# Patient Record
Sex: MALE | Race: WHITE | NOT HISPANIC OR LATINO | Employment: PART TIME | URBAN - METROPOLITAN AREA
[De-identification: names, ages, dates, MRNs, and addresses within clinical notes are randomized per-mention and may not be internally consistent; named-entity substitution may affect disease eponyms.]

---

## 2018-02-19 ENCOUNTER — HOSPITAL ENCOUNTER (INPATIENT)
Facility: HOSPITAL | Age: 78
LOS: 4 days | Discharge: HOME | DRG: 378 | End: 2018-02-23
Attending: EMERGENCY MEDICINE | Admitting: HOSPITALIST
Payer: MEDICARE

## 2018-02-19 DIAGNOSIS — Z72.0 TOBACCO ABUSE: ICD-10-CM

## 2018-02-19 DIAGNOSIS — K92.2 UPPER GI BLEED: ICD-10-CM

## 2018-02-19 DIAGNOSIS — K92.0 GASTROINTESTINAL HEMORRHAGE WITH HEMATEMESIS: ICD-10-CM

## 2018-02-19 DIAGNOSIS — D62 ACUTE BLOOD LOSS ANEMIA: ICD-10-CM

## 2018-02-19 DIAGNOSIS — D64.9 ANEMIA: Primary | ICD-10-CM

## 2018-02-19 DIAGNOSIS — I25.10 CAD (CORONARY ARTERY DISEASE): ICD-10-CM

## 2018-02-19 LAB
ABO GROUP BLD: NORMAL
ALBUMIN SERPL BCP-MCNC: 3 G/DL (ref 3.5–5)
ALP SERPL-CCNC: 45 U/L (ref 46–116)
ALT SERPL W P-5'-P-CCNC: 19 U/L (ref 12–78)
ANION GAP SERPL CALCULATED.3IONS-SCNC: 9 MMOL/L (ref 4–13)
APTT PPP: 32 SECONDS (ref 23–35)
AST SERPL W P-5'-P-CCNC: 21 U/L (ref 5–45)
BASOPHILS # BLD AUTO: 0.01 THOUSANDS/ΜL (ref 0–0.1)
BASOPHILS NFR BLD AUTO: 0 % (ref 0–1)
BILIRUB DIRECT SERPL-MCNC: 0.19 MG/DL (ref 0–0.2)
BILIRUB SERPL-MCNC: 0.53 MG/DL (ref 0.2–1)
BLD GP AB SCN SERPL QL: NEGATIVE
BUN SERPL-MCNC: 37 MG/DL (ref 5–25)
CALCIUM SERPL-MCNC: 8.7 MG/DL (ref 8.3–10.1)
CHLORIDE SERPL-SCNC: 105 MMOL/L (ref 100–108)
CO2 SERPL-SCNC: 26 MMOL/L (ref 21–32)
CREAT SERPL-MCNC: 0.86 MG/DL (ref 0.6–1.3)
EOSINOPHIL # BLD AUTO: 0.09 THOUSAND/ΜL (ref 0–0.61)
EOSINOPHIL NFR BLD AUTO: 1 % (ref 0–6)
ERYTHROCYTE [DISTWIDTH] IN BLOOD BY AUTOMATED COUNT: 14 % (ref 11.6–15.1)
GFR SERPL CREATININE-BSD FRML MDRD: 84 ML/MIN/1.73SQ M
GLUCOSE SERPL-MCNC: 110 MG/DL (ref 65–140)
HCT VFR BLD AUTO: 21.3 % (ref 36.5–49.3)
HGB BLD-MCNC: 7.3 G/DL (ref 12–17)
INR PPP: 1.12 (ref 0.86–1.16)
LYMPHOCYTES # BLD AUTO: 2.79 THOUSANDS/ΜL (ref 0.6–4.47)
LYMPHOCYTES NFR BLD AUTO: 27 % (ref 14–44)
MAGNESIUM SERPL-MCNC: 2.2 MG/DL (ref 1.6–2.6)
MCH RBC QN AUTO: 32.3 PG (ref 26.8–34.3)
MCHC RBC AUTO-ENTMCNC: 34.3 G/DL (ref 31.4–37.4)
MCV RBC AUTO: 94 FL (ref 82–98)
MONOCYTES # BLD AUTO: 0.88 THOUSAND/ΜL (ref 0.17–1.22)
MONOCYTES NFR BLD AUTO: 8 % (ref 4–12)
NEUTROPHILS # BLD AUTO: 6.66 THOUSANDS/ΜL (ref 1.85–7.62)
NEUTS SEG NFR BLD AUTO: 64 % (ref 43–75)
NRBC BLD AUTO-RTO: 0 /100 WBCS
PLATELET # BLD AUTO: 191 THOUSANDS/UL (ref 149–390)
PMV BLD AUTO: 11.8 FL (ref 8.9–12.7)
POTASSIUM SERPL-SCNC: 4.3 MMOL/L (ref 3.5–5.3)
PROT SERPL-MCNC: 6.3 G/DL (ref 6.4–8.2)
PROTHROMBIN TIME: 14.4 SECONDS (ref 12.1–14.4)
RBC # BLD AUTO: 2.26 MILLION/UL (ref 3.88–5.62)
RH BLD: POSITIVE
SODIUM SERPL-SCNC: 140 MMOL/L (ref 136–145)
SPECIMEN EXPIRATION DATE: NORMAL
WBC # BLD AUTO: 10.43 THOUSAND/UL (ref 4.31–10.16)

## 2018-02-19 PROCEDURE — 83735 ASSAY OF MAGNESIUM: CPT | Performed by: EMERGENCY MEDICINE

## 2018-02-19 PROCEDURE — 82272 OCCULT BLD FECES 1-3 TESTS: CPT

## 2018-02-19 PROCEDURE — C9113 INJ PANTOPRAZOLE SODIUM, VIA: HCPCS | Performed by: EMERGENCY MEDICINE

## 2018-02-19 PROCEDURE — 30233N1 TRANSFUSION OF NONAUTOLOGOUS RED BLOOD CELLS INTO PERIPHERAL VEIN, PERCUTANEOUS APPROACH: ICD-10-PCS | Performed by: HOSPITALIST

## 2018-02-19 PROCEDURE — 80076 HEPATIC FUNCTION PANEL: CPT | Performed by: EMERGENCY MEDICINE

## 2018-02-19 PROCEDURE — 99223 1ST HOSP IP/OBS HIGH 75: CPT | Performed by: INTERNAL MEDICINE

## 2018-02-19 PROCEDURE — 96361 HYDRATE IV INFUSION ADD-ON: CPT

## 2018-02-19 PROCEDURE — 36430 TRANSFUSION BLD/BLD COMPNT: CPT

## 2018-02-19 PROCEDURE — 86901 BLOOD TYPING SEROLOGIC RH(D): CPT | Performed by: EMERGENCY MEDICINE

## 2018-02-19 PROCEDURE — 36415 COLL VENOUS BLD VENIPUNCTURE: CPT | Performed by: EMERGENCY MEDICINE

## 2018-02-19 PROCEDURE — P9021 RED BLOOD CELLS UNIT: HCPCS

## 2018-02-19 PROCEDURE — 86900 BLOOD TYPING SEROLOGIC ABO: CPT | Performed by: EMERGENCY MEDICINE

## 2018-02-19 PROCEDURE — 80048 BASIC METABOLIC PNL TOTAL CA: CPT | Performed by: EMERGENCY MEDICINE

## 2018-02-19 PROCEDURE — 85730 THROMBOPLASTIN TIME PARTIAL: CPT | Performed by: EMERGENCY MEDICINE

## 2018-02-19 PROCEDURE — 85025 COMPLETE CBC W/AUTO DIFF WBC: CPT | Performed by: EMERGENCY MEDICINE

## 2018-02-19 PROCEDURE — 86920 COMPATIBILITY TEST SPIN: CPT

## 2018-02-19 PROCEDURE — 86850 RBC ANTIBODY SCREEN: CPT | Performed by: EMERGENCY MEDICINE

## 2018-02-19 PROCEDURE — 85610 PROTHROMBIN TIME: CPT | Performed by: EMERGENCY MEDICINE

## 2018-02-19 PROCEDURE — 96374 THER/PROPH/DIAG INJ IV PUSH: CPT

## 2018-02-19 RX ORDER — NICOTINE 21 MG/24HR
1 PATCH, TRANSDERMAL 24 HOURS TRANSDERMAL DAILY
Status: DISCONTINUED | OUTPATIENT
Start: 2018-02-20 | End: 2018-02-23 | Stop reason: HOSPADM

## 2018-02-19 RX ORDER — ONDANSETRON 2 MG/ML
4 INJECTION INTRAMUSCULAR; INTRAVENOUS EVERY 4 HOURS PRN
Status: DISCONTINUED | OUTPATIENT
Start: 2018-02-19 | End: 2018-02-23 | Stop reason: HOSPADM

## 2018-02-19 RX ORDER — PANTOPRAZOLE SODIUM 40 MG/1
40 INJECTION, POWDER, FOR SOLUTION INTRAVENOUS ONCE
Status: COMPLETED | OUTPATIENT
Start: 2018-02-19 | End: 2018-02-19

## 2018-02-19 RX ORDER — CARVEDILOL 25 MG/1
25 TABLET ORAL 2 TIMES DAILY
COMMUNITY
Start: 2017-03-11

## 2018-02-19 RX ORDER — ROSUVASTATIN CALCIUM 20 MG/1
20 TABLET, COATED ORAL DAILY
Status: ON HOLD | COMMUNITY
Start: 2017-03-24 | End: 2021-10-14 | Stop reason: ALTCHOICE

## 2018-02-19 RX ORDER — VALSARTAN 320 MG/1
1 TABLET ORAL
COMMUNITY
Start: 2017-04-15 | End: 2019-09-25 | Stop reason: SDUPTHER

## 2018-02-19 RX ORDER — TAMSULOSIN HYDROCHLORIDE 0.4 MG/1
1 CAPSULE ORAL SEE ADMIN INSTRUCTIONS
COMMUNITY
Start: 2017-04-27 | End: 2019-09-25 | Stop reason: SDUPTHER

## 2018-02-19 RX ORDER — SODIUM CHLORIDE 9 MG/ML
150 INJECTION, SOLUTION INTRAVENOUS CONTINUOUS
Status: DISCONTINUED | OUTPATIENT
Start: 2018-02-19 | End: 2018-02-21

## 2018-02-19 RX ORDER — POLYETHYLENE GLYCOL 3350 17 G/17G
17 POWDER, FOR SOLUTION ORAL DAILY
Status: DISCONTINUED | OUTPATIENT
Start: 2018-02-20 | End: 2018-02-23 | Stop reason: HOSPADM

## 2018-02-19 RX ORDER — BRIMONIDINE TARTRATE 0.15 %
1 DROPS OPHTHALMIC (EYE) 2 TIMES DAILY
COMMUNITY
Start: 2017-09-13 | End: 2018-11-15 | Stop reason: ALTCHOICE

## 2018-02-19 RX ORDER — ACETAMINOPHEN 325 MG/1
650 TABLET ORAL EVERY 6 HOURS PRN
Status: DISCONTINUED | OUTPATIENT
Start: 2018-02-19 | End: 2018-02-23 | Stop reason: HOSPADM

## 2018-02-19 RX ADMIN — PANTOPRAZOLE SODIUM 40 MG: 40 INJECTION, POWDER, FOR SOLUTION INTRAVENOUS at 17:09

## 2018-02-19 RX ADMIN — SODIUM CHLORIDE 150 ML/HR: 0.9 INJECTION, SOLUTION INTRAVENOUS at 20:25

## 2018-02-19 RX ADMIN — SODIUM CHLORIDE 1000 ML: 0.9 INJECTION, SOLUTION INTRAVENOUS at 16:26

## 2018-02-19 NOTE — ED PROVIDER NOTES
History  Chief Complaint   Patient presents with    Abnormal Lab     pt had a fall a week ago and was dx with left humerus fx  pt scheduled for sx on wednesday, had preop blood testing done today  received phone call pt's hemoglobin 4  pt admits to dark stools past couple days  c/o feeling tired  no other symptoms at this time   67 YO male presents for evaluation of anemia  Pt states he recently fractured his Left humerus during a mechanical fall  Pt was being evaluated for surgery and had routine testing which showed a Hgb of 4  Pt states he has had black stool for the last few days  Denies abdominal pain, acid reflux  Pt states he has had bleeding ulcers in the past  States he has had some fatigue, denies shortness of breath and chest pain  Pt denies CP/SOB/F/C/N/V/D/C, no dysuria, burning on urination or blood in urine  History provided by:  Patient and spouse   used: No    Medical Problem   Severity:  Moderate  Onset quality:  Unable to specify  Timing:  Constant  Progression:  Unable to specify  Chronicity:  New  Context:  Anemia found on routine testing  Associated symptoms: fatigue    Associated symptoms: no abdominal pain, no chest pain, no congestion, no cough, no diarrhea, no fever, no myalgias, no nausea, no rash, no shortness of breath and no vomiting        None       Past Medical History:   Diagnosis Date    GI bleed     Heart murmur     Hyperlipidemia        Past Surgical History:   Procedure Laterality Date    CAROTID ARTERY ANGIOPLASTY         History reviewed  No pertinent family history  I have reviewed and agree with the history as documented  Social History   Substance Use Topics    Smoking status: Never Smoker    Smokeless tobacco: Current User    Alcohol use No      Comment: 1 drink of vodka daily , last drink 5 days ago         Review of Systems   Constitutional: Positive for fatigue  Negative for fever     HENT: Negative for congestion and dental problem  Eyes: Negative for visual disturbance  Respiratory: Negative for cough and shortness of breath  Cardiovascular: Negative for chest pain  Gastrointestinal: Negative for abdominal pain, diarrhea, nausea and vomiting  Genitourinary: Negative for dysuria and frequency  Musculoskeletal: Negative for myalgias, neck pain and neck stiffness  Skin: Negative for rash  Neurological: Negative for dizziness, weakness and light-headedness  Psychiatric/Behavioral: Negative for agitation, behavioral problems and confusion  All other systems reviewed and are negative  Physical Exam  ED Triage Vitals [02/19/18 1543]   Temperature Pulse Respirations Blood Pressure SpO2   97 7 °F (36 5 °C) 102 18 127/57 97 %      Temp Source Heart Rate Source Patient Position - Orthostatic VS BP Location FiO2 (%)   Oral Monitor Sitting Right arm --      Pain Score       No Pain           Orthostatic Vital Signs  Vitals:    02/19/18 1543 02/19/18 1715 02/19/18 1838   BP: 127/57 106/54 107/55   Pulse: 102 93 92   Patient Position - Orthostatic VS: Sitting Sitting        Physical Exam   Constitutional: He is oriented to person, place, and time  He appears well-developed and well-nourished  HENT:   Head: Normocephalic and atraumatic  Eyes: EOM are normal    Neck: Normal range of motion  Cardiovascular: Normal rate, regular rhythm and normal heart sounds  Pulmonary/Chest: Effort normal and breath sounds normal    Abdominal: Soft  Genitourinary: Rectal exam shows guaiac positive stool  Musculoskeletal: Normal range of motion  Neurological: He is alert and oriented to person, place, and time  Skin: Skin is warm and dry  Psychiatric: He has a normal mood and affect  His behavior is normal    Nursing note and vitals reviewed        ED Medications  Medications   sodium chloride 0 9 % bolus 1,000 mL (0 mL Intravenous Stopped 2/19/18 1801)   pantoprazole (PROTONIX) injection 40 mg (40 mg Intravenous Given 2/19/18 1709)       Diagnostic Studies  Results Reviewed     Procedure Component Value Units Date/Time    Basic metabolic panel [[id number]]  (Abnormal) Collected:  02/19/18 1624    Lab Status:  Final result Specimen:  Blood from Arm, Right Updated:  02/19/18 1711     Sodium 140 mmol/L      Potassium 4 3 mmol/L      Chloride 105 mmol/L      CO2 26 mmol/L      Anion Gap 9 mmol/L      BUN 37 (H) mg/dL      Creatinine 0 86 mg/dL      Glucose 110 mg/dL      Calcium 8 7 mg/dL      eGFR 84 ml/min/1 73sq m     Narrative:         National Kidney Disease Education Program recommendations are as follows:  GFR calculation is accurate only with a steady state creatinine  Chronic Kidney disease less than 60 ml/min/1 73 sq  meters  Kidney failure less than 15 ml/min/1 73 sq  meters  Hepatic function panel [[id number]]  (Abnormal) Collected:  02/19/18 1624    Lab Status:  Final result Specimen:  Blood from Arm, Right Updated:  02/19/18 1711     Total Bilirubin 0 53 mg/dL      Bilirubin, Direct 0 19 mg/dL      Alkaline Phosphatase 45 (L) U/L      AST 21 U/L      ALT 19 U/L      Total Protein 6 3 (L) g/dL      Albumin 3 0 (L) g/dL     Magnesium [[id number]]  (Normal) Collected:  02/19/18 1624    Lab Status:  Final result Specimen:  Blood from Arm, Right Updated:  02/19/18 1711     Magnesium 2 2 mg/dL     Protime-INR [[id number]]  (Normal) Collected:  02/19/18 1624    Lab Status:  Final result Specimen:  Blood from Arm, Right Updated:  02/19/18 1654     Protime 14 4 seconds      INR 1 12    APTT [[id number]]  (Normal) Collected:  02/19/18 1624    Lab Status:  Final result Specimen:  Blood from Arm, Right Updated:  02/19/18 1654     PTT 32 seconds     Narrative:          Therapeutic Heparin Range = 60-90 seconds    CBC and differential [[id number]]  (Abnormal) Collected:  02/19/18 1624    Lab Status:  Final result Specimen:  Blood from Arm, Right Updated:  02/19/18 1631     WBC 10 43 (H) Thousand/uL      RBC 2 26 (L) Million/uL Hemoglobin 7 3 (L) g/dL      Hematocrit 21 3 (L) %      MCV 94 fL      MCH 32 3 pg      MCHC 34 3 g/dL      RDW 14 0 %      MPV 11 8 fL      Platelets 707 Thousands/uL      nRBC 0 /100 WBCs      Neutrophils Relative 64 %      Lymphocytes Relative 27 %      Monocytes Relative 8 %      Eosinophils Relative 1 %      Basophils Relative 0 %      Neutrophils Absolute 6 66 Thousands/µL      Lymphocytes Absolute 2 79 Thousands/µL      Monocytes Absolute 0 88 Thousand/µL      Eosinophils Absolute 0 09 Thousand/µL      Basophils Absolute 0 01 Thousands/µL                  No orders to display              Procedures  Procedures       Phone Contacts  ED Phone Contact    ED Course  ED Course                                MDM  Number of Diagnoses or Management Options  Anemia: new and requires workup  Upper GI bleed: new and requires workup  Diagnosis management comments: 1  GI bleed - Pt with dark, tarry stool, outpatient testing showing low Hgb  Will recheck Hgb, type/screen, PTT/PT  Will give fluids, start protonix, likely require PRBC's         Amount and/or Complexity of Data Reviewed  Clinical lab tests: ordered and reviewed  Obtain history from someone other than the patient: yes  Discuss the patient with other providers: yes    Patient Progress  Patient progress: stable    CritCare Time    Disposition  Final diagnoses:   Anemia   Upper GI bleed     Time reflects when diagnosis was documented in both MDM as applicable and the Disposition within this note     Time User Action Codes Description Comment    2/19/2018  6:34 PM Torito Carter [D64 9] Anemia     2/19/2018  6:34 PM Ina Thrasher [K92 2] Upper GI bleed       ED Disposition     ED Disposition Condition Comment    Admit  Case was discussed with [facility] PA and the patient's admission status was agreed to be Admission Status: inpatient status to the service of Dr Aaron Laguerre          Follow-up Information    None       Patient's Medications    No medications on file     No discharge procedures on file      ED Provider  Electronically Signed by           Vern Kern MD  02/19/18 2240

## 2018-02-20 ENCOUNTER — ANESTHESIA EVENT (INPATIENT)
Dept: GASTROENTEROLOGY | Facility: HOSPITAL | Age: 78
DRG: 378 | End: 2018-02-20
Payer: MEDICARE

## 2018-02-20 ENCOUNTER — ANESTHESIA (INPATIENT)
Dept: GASTROENTEROLOGY | Facility: HOSPITAL | Age: 78
DRG: 378 | End: 2018-02-20
Payer: MEDICARE

## 2018-02-20 PROBLEM — K92.2 UPPER GI BLEED: Status: ACTIVE | Noted: 2018-02-19

## 2018-02-20 LAB
ABO GROUP BLD BPU: NORMAL
ABO GROUP BLD BPU: NORMAL
ANION GAP SERPL CALCULATED.3IONS-SCNC: 8 MMOL/L (ref 4–13)
BPU ID: NORMAL
BPU ID: NORMAL
BUN SERPL-MCNC: 29 MG/DL (ref 5–25)
CALCIUM SERPL-MCNC: 8.6 MG/DL (ref 8.3–10.1)
CHLORIDE SERPL-SCNC: 107 MMOL/L (ref 100–108)
CO2 SERPL-SCNC: 26 MMOL/L (ref 21–32)
CREAT SERPL-MCNC: 0.76 MG/DL (ref 0.6–1.3)
CROSSMATCH: NORMAL
CROSSMATCH: NORMAL
ERYTHROCYTE [DISTWIDTH] IN BLOOD BY AUTOMATED COUNT: 14.7 % (ref 11.6–15.1)
GFR SERPL CREATININE-BSD FRML MDRD: 88 ML/MIN/1.73SQ M
GLUCOSE SERPL-MCNC: 122 MG/DL (ref 65–140)
HCT VFR BLD AUTO: 24.9 % (ref 36.5–49.3)
HCT VFR BLD AUTO: 25.8 % (ref 36.5–49.3)
HCT VFR BLD AUTO: 25.8 % (ref 36.5–49.3)
HCT VFR BLD AUTO: 25.9 % (ref 36.5–49.3)
HGB BLD-MCNC: 8.6 G/DL (ref 12–17)
HGB BLD-MCNC: 8.7 G/DL (ref 12–17)
HGB BLD-MCNC: 8.9 G/DL (ref 12–17)
MCH RBC QN AUTO: 31.3 PG (ref 26.8–34.3)
MCHC RBC AUTO-ENTMCNC: 33.7 G/DL (ref 31.4–37.4)
MCV RBC AUTO: 93 FL (ref 82–98)
PLATELET # BLD AUTO: 154 THOUSANDS/UL (ref 149–390)
PMV BLD AUTO: 12 FL (ref 8.9–12.7)
POTASSIUM SERPL-SCNC: 3.9 MMOL/L (ref 3.5–5.3)
RBC # BLD AUTO: 2.78 MILLION/UL (ref 3.88–5.62)
SODIUM SERPL-SCNC: 141 MMOL/L (ref 136–145)
UNIT DISPENSE STATUS: NORMAL
UNIT DISPENSE STATUS: NORMAL
UNIT PRODUCT CODE: NORMAL
UNIT PRODUCT CODE: NORMAL
UNIT RH: NORMAL
UNIT RH: NORMAL
WBC # BLD AUTO: 7.52 THOUSAND/UL (ref 4.31–10.16)

## 2018-02-20 PROCEDURE — 36415 COLL VENOUS BLD VENIPUNCTURE: CPT | Performed by: INTERNAL MEDICINE

## 2018-02-20 PROCEDURE — 80048 BASIC METABOLIC PNL TOTAL CA: CPT | Performed by: INTERNAL MEDICINE

## 2018-02-20 PROCEDURE — 43255 EGD CONTROL BLEEDING ANY: CPT | Performed by: INTERNAL MEDICINE

## 2018-02-20 PROCEDURE — 85018 HEMOGLOBIN: CPT | Performed by: INTERNAL MEDICINE

## 2018-02-20 PROCEDURE — 99232 SBSQ HOSP IP/OBS MODERATE 35: CPT | Performed by: HOSPITALIST

## 2018-02-20 PROCEDURE — 99284 EMERGENCY DEPT VISIT MOD MDM: CPT

## 2018-02-20 PROCEDURE — 0W3P8ZZ CONTROL BLEEDING IN GASTROINTESTINAL TRACT, VIA NATURAL OR ARTIFICIAL OPENING ENDOSCOPIC: ICD-10-PCS | Performed by: INTERNAL MEDICINE

## 2018-02-20 PROCEDURE — 85014 HEMATOCRIT: CPT | Performed by: INTERNAL MEDICINE

## 2018-02-20 PROCEDURE — 3E0G8GC INTRODUCTION OF OTHER THERAPEUTIC SUBSTANCE INTO UPPER GI, VIA NATURAL OR ARTIFICIAL OPENING ENDOSCOPIC: ICD-10-PCS | Performed by: INTERNAL MEDICINE

## 2018-02-20 PROCEDURE — 85027 COMPLETE CBC AUTOMATED: CPT | Performed by: INTERNAL MEDICINE

## 2018-02-20 PROCEDURE — 43239 EGD BIOPSY SINGLE/MULTIPLE: CPT | Performed by: INTERNAL MEDICINE

## 2018-02-20 PROCEDURE — C9113 INJ PANTOPRAZOLE SODIUM, VIA: HCPCS | Performed by: INTERNAL MEDICINE

## 2018-02-20 PROCEDURE — 88305 TISSUE EXAM BY PATHOLOGIST: CPT | Performed by: PATHOLOGY

## 2018-02-20 PROCEDURE — 88342 IMHCHEM/IMCYTCHM 1ST ANTB: CPT | Performed by: PATHOLOGY

## 2018-02-20 PROCEDURE — 88342 IMHCHEM/IMCYTCHM 1ST ANTB: CPT | Performed by: INTERNAL MEDICINE

## 2018-02-20 PROCEDURE — 88305 TISSUE EXAM BY PATHOLOGIST: CPT | Performed by: INTERNAL MEDICINE

## 2018-02-20 PROCEDURE — 0DB78ZX EXCISION OF STOMACH, PYLORUS, VIA NATURAL OR ARTIFICIAL OPENING ENDOSCOPIC, DIAGNOSTIC: ICD-10-PCS | Performed by: INTERNAL MEDICINE

## 2018-02-20 PROCEDURE — 99222 1ST HOSP IP/OBS MODERATE 55: CPT | Performed by: INTERNAL MEDICINE

## 2018-02-20 RX ORDER — PROPOFOL 10 MG/ML
INJECTION, EMULSION INTRAVENOUS AS NEEDED
Status: DISCONTINUED | OUTPATIENT
Start: 2018-02-20 | End: 2018-02-20 | Stop reason: SURG

## 2018-02-20 RX ADMIN — SODIUM CHLORIDE 8 MG/HR: 9 INJECTION, SOLUTION INTRAVENOUS at 18:25

## 2018-02-20 RX ADMIN — SODIUM CHLORIDE: 0.9 INJECTION, SOLUTION INTRAVENOUS at 15:56

## 2018-02-20 RX ADMIN — PROPOFOL 50 MG: 10 INJECTION, EMULSION INTRAVENOUS at 15:45

## 2018-02-20 RX ADMIN — PROPOFOL 50 MG: 10 INJECTION, EMULSION INTRAVENOUS at 15:40

## 2018-02-20 RX ADMIN — PROPOFOL 50 MG: 10 INJECTION, EMULSION INTRAVENOUS at 15:49

## 2018-02-20 RX ADMIN — PROPOFOL 130 MG: 10 INJECTION, EMULSION INTRAVENOUS at 15:27

## 2018-02-20 RX ADMIN — LIDOCAINE HYDROCHLORIDE 100 MG: 20 INJECTION, SOLUTION INTRAVENOUS at 15:27

## 2018-02-20 RX ADMIN — PROPOFOL 50 MG: 10 INJECTION, EMULSION INTRAVENOUS at 15:34

## 2018-02-20 RX ADMIN — PROPOFOL 50 MG: 10 INJECTION, EMULSION INTRAVENOUS at 15:31

## 2018-02-20 RX ADMIN — PROPOFOL 50 MG: 10 INJECTION, EMULSION INTRAVENOUS at 15:37

## 2018-02-20 NOTE — ED NOTES
First unit of blood stopped by previous nursing staff  Pt had received full amount of blood       Jacqulin Runner, RN  02/19/18 3007

## 2018-02-20 NOTE — PLAN OF CARE
HEMATOLOGIC - ADULT     Maintains hematologic stability Progressing        Knowledge Deficit     Patient/family/caregiver demonstrates understanding of disease process, treatment plan, medications, and discharge instructions Progressing        MUSCULOSKELETAL - ADULT     Maintain proper alignment of affected body part Progressing        PAIN - ADULT     Verbalizes/displays adequate comfort level or baseline comfort level Progressing        Potential for Falls     Patient will remain free of falls Progressing

## 2018-02-20 NOTE — H&P
History and Physical - [facility] Internal Medicine    Patient Information: Shamika Boo 68 y o  male MRN: [id number]  Unit/Bed#: ED 23 Encounter: [id number]  Admitting Physician: Natalie Riley MD  PCP: Alley Mackay MD  Date of Admission:  02/19/18        Chief Complaint:  Abnormal lab tests  History of Present Illness:    Shamika Boo is a 68 y o  male with past medical history of peptic ulcer disease, carotid artery disease on aspirin, patient had a mechanical fall a week ago where he ended up with fracture of his left humerus, he was supposed to go for surgery this Wednesday and he was completing his outpatient pre surgery testing  Patient had a hemoglobin of 4 and he was instructed to rushed to the ER for further management  In the ER, his repeat hemoglobin was 7 3  The patient reports black stools  He does have history of upper GI bleed where he was found to have peptic ulcer disease by EGD  The patient will be admitted for blood transfusion, Protonix drip, and EGD in a m       Afebrile  Denies any chest pain, SOB, coughing or palpitations  No abd pain, nausea, vomiting, or diarrhea  No dysuria or polyuria  No headaches or dizziness  Review of Systems:  All 10 point review of systems was discussed and otherwise negative    Past Medical and Surgical History:     Past Medical History:   Diagnosis Date    GI bleed     Heart murmur     Hyperlipidemia        Past Surgical History:   Procedure Laterality Date    CAROTID ARTERY ANGIOPLASTY         Meds/Allergies:    Prior to Admission medications    Not on File     I have reviewed home medications with patient personally      Allergies: No Known Allergies    Social History:     Marital Status: /Civil Union     History   Alcohol Use No     Comment: 1 drink of vodka daily , last drink 5 days ago      History   Smoking Status    Never Smoker   Smokeless Tobacco    Current User     History   Drug Use No       Family History:  Asked and noncontributory    Physical Exam:     Vitals:   Blood Pressure: 107/55 (02/19/18 1838)  Pulse: 92 (02/19/18 1838)  Temperature: 97 7 °F (36 5 °C) (02/19/18 1543)  Temp Source: Oral (02/19/18 1543)  Respirations: 16 (02/19/18 1838)  SpO2: 100 % (02/19/18 1838)    General: Alert , Ox3  Head: Normocephalic atraumatic  Eyes: ZHANG  Anicteric sclera  H&N: Supple neck  No palpable lymphadenopathy  Chest CTA BL  No wheezing or crackles  Heart: S1, S2 RRR, loud syst murmur  Abd: soft, Non tender, non distended  Extremities: No oedema  No clubbing or cyanosis  Skin: Intact, no rash  Neuro: Moving all 4 extremities  Additional Data:     Lab Results: I have personally reviewed pertinent reports  Results from last 7 days  Lab Units 02/19/18  1624   WBC Thousand/uL 10 43*   HEMOGLOBIN g/dL 7 3*   HEMATOCRIT % 21 3*   PLATELETS Thousands/uL 191   NEUTROS PCT % 64   LYMPHS PCT % 27   MONOS PCT % 8   EOS PCT % 1       Results from last 7 days  Lab Units 02/19/18  1624   SODIUM mmol/L 140   POTASSIUM mmol/L 4 3   CHLORIDE mmol/L 105   CO2 mmol/L 26   BUN mg/dL 37*   CREATININE mg/dL 0 86   CALCIUM mg/dL 8 7   TOTAL PROTEIN g/dL 6 3*   BILIRUBIN TOTAL mg/dL 0 53   ALK PHOS U/L 45*   ALT U/L 19   AST U/L 21   GLUCOSE RANDOM mg/dL 110       Results from last 7 days  Lab Units 02/19/18  1624   INR  1 12       Imaging: I have personally reviewed pertinent reports  Assessment/Plan:    Hospital Problem List:     Principal Problem:    GI bleed  Active Problems:    Acute blood loss anemia      Plan for the Primary Problem(s):  GI bleed  Acute blood loss anemia  Will admit patient to telemetry  Will hold baby aspirin  H&H q 4 hours  Start Protonix drip  Monitor vital signs closely  Will transfuse 2 units of packed RBCs now  Patient will be NPO for preparation for EGD in a m     Will consult GI    Further recommendations will depend on clinical progression of the case    Carotid artery disease status post endarterectomy  Will hold aspirin and statins  VTE Prophylaxis: No heparin products due to GI bleed  / sequential compression device   Code Status:  Full code  POLST: POLST form is not discussed and not completed at this time  Anticipated Length of Stay:  Patient will be admitted on an Inpatient basis with an anticipated length of stay of  > 2 midnights  Justification for Hospital Stay:  Management of GI bleed    Total Time for Visit, including Counseling / Coordination of Care: 30 minutes  Greater than 50% of this total time spent on direct patient counseling and coordination of care  ** Please Note: Dragon 360 Dictation voice to text software may have been used in the creation of this document   **

## 2018-02-20 NOTE — ED NOTES
Per Brandon wait until second unit of RBCs is completely transfused and then obtain repeat H+H        Liz Pedroza, RN  02/19/18 7516

## 2018-02-20 NOTE — ED NOTES
No blood transfusing when resumed patient care at 0645  Noted that Elisha Batista  Stated in report that 2 units of blood was transfused last night         Benji Gasca RN  02/20/18 6507

## 2018-02-20 NOTE — OP NOTE
OPERATIVE REPORT  PATIENT NAME: Zena Reyes    :  1940  MRN: [id number]  Pt Location: [facility] ROOM 01    SURGERY DATE: 2018    Surgeon(s) and Role:     * Raulito Colmenares MD - Primary    ESOPHAGOGASTRODUODENOSCOPY    PROCEDURE: EGD    SEDATION: Monitored anesthesia care, check anesthesia records    ASA Class: 3    INDICATIONS:  Melena    CONSENT:  Informed consent was obtained for the procedure, including sedation after explaining the risks and benefits of the procedure  Risks including but not limited to bleeding, perforation, infection, and missed lesion  PREPARATION:   Telemetry, pulse oximetry, blood pressure were monitored throughout the procedure  Patient was identified by myself both verbally and by visual inspection of ID band  DESCRIPTION:   Patient was placed in the left lateral decubitus position and was sedated with the above medication  The gastroscope was introduced in to the oropharynx and the esophagus was intubated under direct visualization  Scope was passed down the esophagus up to 2nd part of the duodenum  A careful inspection was made as the gastroscope was withdrawn, including a retroflexed view of the stomach; findings and interventions are described below  FINDINGS:    #1  Esophagus-normal esophagus    #2  Stomach-mild erosive gastritis in the antrum  Two cold biopsies were taken to rule out H pylori infection  Bright red blood noted in the antrum, bulb and 2nd portion of the duodenum  #3  Duodenum-A  10 mm ulcer with actively bleeding visible vessel was noted in the duodenal sweep  The vessel was cauterized using gold probe  Then I injected 1 in 10,000 epinephrine in 1 mL aliquots  A total of 8 mL injected  Then I placed 2 hemoclips  Hemostasis achieved  IMPRESSIONS:      Ulcer with active bleeding in the duodenal sweep  Hemostasis achieved after cauterization, epinephrine injection and hemoclip placement  Gastritis    Biopsy taken to rule out H pylori infection  RECOMMENDATIONS:     Change Protonix to a drip  Monitor hemoglobin closely every 4 hours  NPO  Watch closely for rebleeding  Hold aspirin  COMPLICATIONS:  None; patient tolerated the procedure well            DISPOSITION: PACU           CONDITION: Stable      SIGNATURE: Hugh Bell MD  DATE: February 20, 2018  TIME: 4:06 PM

## 2018-02-20 NOTE — CASE MANAGEMENT
Initial Clinical Review    Admission: Date/Time/Statement: 2/19/18 @ 1835     Orders Placed This Encounter   Procedures    Inpatient Admission (expected length of stay for this patient is greater than two midnights)     Standing Status:   Standing     Number of Occurrences:   1     Order Specific Question:   Admitting Physician     Answer:   Susie Wilks [[id number]]     Order Specific Question:   Level of Care     Answer:   Med Surg [16]     Order Specific Question:   Estimated length of stay     Answer:   More than 2 Midnights     Order Specific Question:   Certification     Answer:   I certify that inpatient services are medically necessary for this patient for a duration of greater than two midnights  See H&P and MD Progress Notes for additional information about the patient's course of treatment  ED: Date/Time/Mode of Arrival:   ED Arrival Information     Expected Arrival Acuity Means of Arrival Escorted By Service Admission Type    - 2/19/2018 15:34 Emergent Walk-In Self General Medicine Emergency    Arrival Complaint    Abnormal Blood Test Result        Chief Complaint:   Chief Complaint   Patient presents with    Abnormal Lab     pt had a fall a week ago and was dx with left humerus fx  pt scheduled for sx on wednesday, had preop blood testing done today  received phone call pt's hemoglobin 4  pt admits to dark stools past couple days  c/o feeling tired  no other symptoms at this time   History of Illness: 67 YO male presents for evaluation of anemia  Pt states he recently fractured his Left humerus during a mechanical fall  Pt was being evaluated for surgery and had routine testing which showed a Hgb of 4  Pt states he has had black stool for the last few days  Denies abdominal pain, acid reflux  Pt states he has had bleeding ulcers in the past  States he has had some fatigue, denies shortness of breath and chest pain   Pt denies CP/SOB/F/C/N/V/D/C, no dysuria, burning on urination or blood in urine     ED Vital Signs:   ED Triage Vitals [02/19/18 1543]   Temperature Pulse Respirations Blood Pressure SpO2   97 7 °F (36 5 °C) 102 18 127/57 97 %      Temp Source Heart Rate Source Patient Position - Orthostatic VS BP Location FiO2 (%)   Oral Monitor Sitting Right arm --      Pain Score       No Pain        Wt Readings from Last 1 Encounters:   No data found for Wt       Abnormal Labs/Diagnostic Test Results:  BUN 37,   T Pro 6 3,   Alb 3 0,   WBC's 10 43,   H&H 7 3 / 21 3    ED Treatment:   Medication Administration from 02/19/2018 1534 to 02/20/2018 1893       Date/Time Order Dose Route Action Action by Comments     02/19/2018 1801 sodium chloride 0 9 % bolus 1,000 mL 0 mL Intravenous Stopped Shanelle Farias RN      02/19/2018 1626 sodium chloride 0 9 % bolus 1,000 mL 1,000 mL Intravenous [address] Shanelle Farias RN      02/19/2018 1709 pantoprazole (PROTONIX) injection 40 mg 40 mg Intravenous Given Shanelle Farias RN      02/20/2018 0826 polyethylene glycol (MIRALAX) packet 17 g 17 g Oral Not Given Mary Phan RN pt having regular formed stools     02/20/2018 0825 nicotine (NICODERM CQ) 14 mg/24hr TD 24 hr patch 1 patch 1 patch Transdermal Not Given Mary Phan, SANTANA pt not a smoker     02/19/2018 2025 sodium chloride 0 9 % infusion 150 mL/hr Intravenous New Bag Bayron Trujillo RN           Past Medical/Surgical History:    Active Ambulatory Problems     Diagnosis Date Noted    No Active Ambulatory Problems     Resolved Ambulatory Problems     Diagnosis Date Noted    No Resolved Ambulatory Problems     Past Medical History:   Diagnosis Date    GI bleed     Heart murmur     Hyperlipidemia        Admitting Diagnosis: Anemia [D64 9]  Upper GI bleed [K92 2]  Abnormal laboratory test result [R89 9]    Age/Sex: 68 y o  male  with past medical history of peptic ulcer disease, carotid artery disease on aspirin, patient had a mechanical fall a week ago where he ended up with fracture of his left humerus, he was supposed to go for surgery this Wednesday and he was completing his outpatient pre surgery testing  Patient had a hemoglobin of 4 and he was instructed to rushed to the ER for further management  In the ER, his repeat hemoglobin was 7 3  The patient reports black stools  He does have history of upper GI bleed where he was found to have peptic ulcer disease by EGD  The patient will be admitted for blood transfusion, Protonix drip, and EGD in a m        Afebrile  Denies any chest pain, SOB, coughing or palpitations  No abd pain, nausea, vomiting, or diarrhea  No dysuria or polyuria  No headaches or dizziness  Assessment/Plan:     GI bleed  Active Problems:    Acute blood loss anemia    Plan for the Primary Problem(s):  GI bleed  Acute blood loss anemia  Will admit patient to telemetry  Will hold baby aspirin  H&H q 4 hours  Start Protonix drip  Monitor vital signs closely  Will transfuse 2 units of packed RBCs now  Patient will be NPO for preparation for EGD in a      Will consult GI  Further recommendations will depend on clinical progression of the case     Carotid artery disease status post endarterectomy  Will hold aspirin and statins      VTE Prophylaxis: No heparin products due to GI bleed  / sequential compression device   Code Status:  Full code  POLST: POLST form is not discussed and not completed at this time      Anticipated Length of Stay:  Patient will be admitted on an Inpatient basis with an anticipated length of stay of  > 2 midnights     Justification for Hospital Stay:  Management of GI bleed    Admission Orders:  IP  TELE  NPO  Transfuse 2 u PC's  Consult GI  SCD's  H&H q4h    Scheduled Meds:   Current Facility-Administered Medications:  acetaminophen 650 mg Oral Q6H PRN Marissa Taylor MD    nicotine 1 patch Transdermal Daily Marissa Taylor MD    ondansetron 4 mg Intravenous Q4H PRN Marissa Taylor MD    polyethylene glycol 17 g Oral Daily Marissa Taylor MD    sodium chloride 150 mL/hr Intravenous Continuous Marissa Taylor MD Last Rate: 150 mL/hr (02/19/18 2025)     Continuous Infusions:   sodium chloride 150 mL/hr Last Rate: 150 mL/hr (02/19/18 2025)     PRN Meds:   acetaminophen    ondansetron      Thank you,  [address] [facility] in the [facility] by [employer]ate[employer] Ruvalc[employer] for 2017  Network Utilization Review Department  Phone: [phone]; Fax [phone]  ATTENTION: The Network Utilization Review Department is now centralized for our 7 Facilities  Make a note that we have a new phone and fax numbers for our Department  Please call with any questions or concerns to [phone] and carefully follow the prompts so that you are directed to the right person  All voicemails are confidential  Fax any determinations, approvals, denials, and requests for initial or continue stay review clinical to [phone]  Due to HIGH CALL volume, it would be easier if you could please send faxed requests to expedite your requests and in part, help us provide discharge notifications faster

## 2018-02-20 NOTE — CONSULTS
Consultation - [facility]  Shira Fitzgerald 68 y o  male MRN: [id number]  Unit/Bed#: [facility] 87 223-01 Encounter: [id number]        Inpatient consult to gastroenterology  Consult performed by: Lupe Mccabe ordered by: Kamaljit Izaguirre          Reason for Consult / Principal Problem:  Upper GI bleed, acute blood loss anemia    HPI: 60-year-old male with history of carotid artery disease on aspirin, aortic stenosis, hyperlipidemia, and GI bleeding presenting for evaluation of melena  Patient reports acute onset of melena 2 days ago  He admits to some associated fatigue but denies shortness of breath and chest pain  He denies nausea, vomiting, heartburn, decreased appetite, abdominal pain, hematochezia, weight loss  He denies NSAID use  He takes a baby aspirin daily but denies taking other antiplatelets or anticoagulants  Of note last week he sustained a mechanical fall and fracture of his left humerus  He was supposed to undergo surgery tomorrow  As part of outpatient pre surgery testing, he had blood work done yesterday which showed hemoglobin of 4 and so he was instructed to come to the ED immediately for further management  In the ED, his repeat hemoglobin was 7 3 down from baseline 14 in June 2017  Patient has prior history of GI bleeding dating back to 2013  He was treated at a hospital in [place]  He states he underwent several upper endoscopies because the doctors could not identify the source of bleeding  He even required a capsule endoscopy  He is unsure whether he was diagnosed with an ulcer but the site of bleeding did require cauterization  REVIEW OF SYSTEMS:    CONSTITUTIONAL: Denies any fever, chills  Good appetite, and no recent weight loss  HEENT: No earache or tinnitus  Denies hearing loss or visual disturbances  CARDIOVASCULAR: No chest pain or palpitations     RESPIRATORY: Denies any cough, hemoptysis, shortness of breath or dyspnea on exertion  GASTROINTESTINAL: As noted in the History of Present Illness  GENITOURINARY: No problems with urination  Denies any hematuria or dysuria  NEUROLOGIC: No dizziness or vertigo, denies headaches  MUSCULOSKELETAL: Denies any muscle or joint pain  SKIN: Denies skin rashes or itching  ENDOCRINE: Denies excessive thirst  Denies intolerance to heat or cold  PSYCHOSOCIAL: Denies depression or anxiety  Denies any recent memory loss  Historical Information   Past Medical History:   Diagnosis Date    GI bleed     Heart murmur     Hyperlipidemia      Past Surgical History:   Procedure Laterality Date    CAROTID ARTERY ANGIOPLASTY       Social History   History   Alcohol Use No     Comment: 1 drink of vodka daily , last drink 5 days ago      History   Drug Use No     History   Smoking Status    Never Smoker   Smokeless Tobacco    Current User     History reviewed  No pertinent family history  Meds/Allergies     Prescriptions Prior to Admission   Medication    brimonidine (ALPHAGAN P) 0 15 % ophthalmic solution    carvedilol (COREG) 25 mg tablet    rosuvastatin (CRESTOR) 20 MG tablet    tamsulosin (FLOMAX) 0 4 mg    ASPIRIN 81 PO    valsartan (DIOVAN) 320 MG tablet     Current Facility-Administered Medications   Medication Dose Route Frequency    acetaminophen (TYLENOL) tablet 650 mg  650 mg Oral Q6H PRN    nicotine (NICODERM CQ) 14 mg/24hr TD 24 hr patch 1 patch  1 patch Transdermal Daily    ondansetron (ZOFRAN) injection 4 mg  4 mg Intravenous Q4H PRN    polyethylene glycol (MIRALAX) packet 17 g  17 g Oral Daily    sodium chloride 0 9 % infusion  150 mL/hr Intravenous Continuous       No Known Allergies        Objective     Blood pressure 135/69, pulse 87, temperature 99 8 °F (37 7 °C), temperature source Temporal, resp  rate 18, SpO2 95 %        Intake/Output Summary (Last 24 hours) at 02/20/18 1019  Last data filed at 02/20/18 0725   Gross per 24 hour   Intake             1700 ml Output              200 ml   Net             1500 ml         PHYSICAL EXAM:      General Appearance:   Alert, pleasant obese male, cooperative, no acute distress, appears stated age    HEENT:   Normocephalic, atraumatic, anicteric      Neck:  Supple, symmetrical, trachea midline, no adenopathy    Lungs:   Clear to auscultation bilaterally; no rales, rhonchi or wheezing; respirations unlabored    Heart[de-identified]   S1 and S2 normal; murmur of AS   Abdomen:   Soft, non-tender, non-distended; normal bowel sounds; no masses, no organomegaly    Genitalia:   Deferred    Rectal:   Deferred    Extremities:  No cyanosis, clubbing or edema    Pulses:  2+ and symmetric all extremities    Skin:  Skin color, texture, turgor normal, no rashes or lesions    Lymph nodes:  No palpable cervical lymphadenopathy        Lab Results:     Results from last 7 days  Lab Units 02/20/18  0531  02/19/18  1624   WBC Thousand/uL 7 52  --  10 43*   HEMOGLOBIN g/dL 8 7*  8 7*  < > 7 3*   HEMATOCRIT % 25 8*  25 8*  < > 21 3*   PLATELETS Thousands/uL 154  --  191   NEUTROS PCT %  --   --  64   LYMPHS PCT %  --   --  27   MONOS PCT %  --   --  8   EOS PCT %  --   --  1   < > = values in this interval not displayed  Results from last 7 days  Lab Units 02/20/18  0531 02/19/18  1624   SODIUM mmol/L 141 140   POTASSIUM mmol/L 3 9 4 3   CHLORIDE mmol/L 107 105   CO2 mmol/L 26 26   BUN mg/dL 29* 37*   CREATININE mg/dL 0 76 0 86   CALCIUM mg/dL 8 6 8 7   TOTAL PROTEIN g/dL  --  6 3*   BILIRUBIN TOTAL mg/dL  --  0 53   ALK PHOS U/L  --  45*   ALT U/L  --  19   AST U/L  --  21   GLUCOSE RANDOM mg/dL 122 110       Results from last 7 days  Lab Units 02/19/18  1624   INR  1 12           Imaging Studies: I have personally reviewed pertinent imaging studies  No results found  ASSESSMENT and PLAN:      69-year-old male with history of carotid artery disease on aspirin, aortic stenosis, hyperlipidemia, and GI bleeding presenting for evaluation of melena      1) Upper GI bleeding with acute blood loss anemia: He admits to black stools for the past 2 days, similar to his prior GI bleed back in 2013 which required endoscopic therapy with cauterization  Upon presentation, hemoglobin 7 3 down from baseline of 14 in June 2017  He received 2 units of PRBCs and hemoglobin responded appropriately to 8 9  BUN elevated at 37  He appears in no acute distress and is hemodynamically stable  Differential diagnosis includes sources of upper GI bleeding including erosive esophagitis, gastritis, peptic ulcer disease, Dieulafoy's lesion, AVM, mass     -Keep NPO and plan for EGD later today  -Continue Protonix twice daily  -Monitor H&H and transfuse as necessary  -Avoid NSAIDs  -If source of bleeding not identified on EGD, may need push enteroscopy or capsule endoscopy to evaluate for small bowel AVMs    Patient was seen and examined by Dr Nubia Renee  All salcido medical decisions were made by Dr Nubia Renee  Thank you for allowing us to participate in the care of this present patient  We will follow-up with you closely

## 2018-02-20 NOTE — ED NOTES
Patient verbally understood importance of remaining NPO at this time  Patient calm/cooperative  Sling applied to L arm for patient comfort       Obed Chaves RN  02/20/18 9665

## 2018-02-20 NOTE — PROGRESS NOTES
Progress Note - Tina Bolton 68 y o  male MRN: [id number]    [facility] [address] 87 223-01 Encounter: [id number]      Assessment/Plan:  1-melena secondary to bleeding duodenal ulcer  Status post cauterization, epinephrine injection and hemoclip placement  Patient remains NPO but will allow ice chips  On a Protonix drip  Will continue to monitor H&H q 4 hours  Biopsies taken for H pylori which will be available in a week's time  Patient remains hemodynamically stable  2-acute blood loss anemia secondary to 1-hemoglobin currently stable at 8 6  Will continue to monitor and transfuse p r n  for hemoglobin less than 7     3-carotid artery disease status post endarterectomy  -aspirin on hold    4-fracture left humerus-patient was scheduled for surgery tomorrow but secondary to the bleed this will be postponed  Left arm in sling  5-ongoing tobacco use  Counseled with regards to cessation  Patient pre contemplative  On a nicotine patch      Subjective:  Patient admitted with melena  Underwent EGD today which revealed a 10 mm ulcer with active bleeding in the duodenum  Hemostasis achieved after cauterization, epinephrine injection and hemoclip placement  Biopsies taken to rule out H pylori infection  Patient on a Protonix drip  Aspirin on hold  Physical Exam:   Vitals: Blood pressure 146/82, pulse 82, temperature 98 4 °F (36 9 °C), temperature source Temporal, resp  rate 18, SpO2 99 %  ,There is no height or weight on file to calculate BMI  Gen:  Pleasant, non-tachypnic, non-dyspnic  Conversant  Heart: regular rate and rhythm, S1S2 present, no murmur, rub or gallop  Lungs: clear to ausculatation bilaterally  No wheezing, crackless, or rhonchi  No accessory muscle use or respiratory distress  Abd: soft, non-tender, non-distended  NABS, no guarding, rebound or peritoneal signs  Extremities:  Left arm in sling  Neuro: awake, alert and oriented  Cranial nerves 2-12 intact    Strength and sensation grossly intact  Skin: warm and dry: no petechiae, purpura and rash      LABS:     Results from last 7 days  Lab Units 02/20/18  1255 02/20/18  0531 02/20/18  0335 02/19/18  1624   WBC Thousand/uL  --  7 52  --  10 43*   HEMOGLOBIN g/dL 8 6* 8 7*  8 7* 8 9* 7 3*   HEMATOCRIT % 24 9* 25 8*  25 8* 25 9* 21 3*   PLATELETS Thousands/uL  --  154  --  191       Results from last 7 days  Lab Units 02/20/18  0531 02/19/18  1624   SODIUM mmol/L 141 140   POTASSIUM mmol/L 3 9 4 3   CHLORIDE mmol/L 107 105   CO2 mmol/L 26 26   BUN mg/dL 29* 37*   CREATININE mg/dL 0 76 0 86   GLUCOSE RANDOM mg/dL 122 110   CALCIUM mg/dL 8 6 8 7       Intake/Output Summary (Last 24 hours) at 02/20/18 1751  Last data filed at 02/20/18 1556   Gross per 24 hour   Intake             2000 ml   Output              200 ml   Net             1800 ml           Current Facility-Administered Medications:  acetaminophen 650 mg Oral Q6H PRN Marissa Taylor MD    nicotine 1 patch Transdermal Daily Marissa Taylor MD    ondansetron 4 mg Intravenous Q4H PRN Marissa Taylor MD    pantoprozole (PROTONIX) infusion (Continuous) 8 mg/hr Intravenous Continuous Dossie MD James    polyethylene glycol 17 g Oral Daily Marissa Taylor MD    sodium chloride 150 mL/hr Intravenous Continuous Marissa Taylor MD Last Rate: 150 mL/hr (02/19/18 2025)       Family update- wife in the room-updated

## 2018-02-20 NOTE — ANESTHESIA POSTPROCEDURE EVALUATION
Post-Op Assessment Note      CV Status:  Stable    Mental Status:  Alert and awake    Hydration Status:  Euvolemic    PONV Controlled:  Controlled    Airway Patency:  Patent    Post Op Vitals Reviewed: Yes          Staff: Anesthesiologist           /58 (02/20/18 1606)    Temp      Pulse 78 (02/20/18 1606)   Resp (!) 28 (02/20/18 1606)    SpO2 97 % (02/20/18 1606)

## 2018-02-20 NOTE — ANESTHESIA PREPROCEDURE EVALUATION
Review of Systems/Medical History          Cardiovascular  Hyperlipidemia, Hypertension controlled,    Pulmonary  Smoker ex-smoker  ,        GI/Hepatic  Negative GI/hepatic ROS          Negative  ROS        Endo/Other  Negative endo/other ROS      GYN       Hematology  Anemia ,     Musculoskeletal  Negative musculoskeletal ROS        Neurology  Negative neurology ROS      Psychology   Negative psychology ROS              Physical Exam    Airway    Mallampati score: II  TM Distance: >3 FB  Neck ROM: full     Dental   upper dentures,     Cardiovascular  Rhythm: regular, Rate: normal, Cardiovascular exam normal    Pulmonary  Pulmonary exam normal Breath sounds clear to auscultation,     Other Findings        Anesthesia Plan  ASA Score- 3     Anesthesia Type- general and IV sedation with anesthesia with ASA Monitors  Additional Monitors:   Airway Plan:         Plan Factors-    Induction- intravenous  Postoperative Plan-     Informed Consent- Anesthetic plan and risks discussed with patient and spouse

## 2018-02-21 LAB
ANION GAP SERPL CALCULATED.3IONS-SCNC: 7 MMOL/L (ref 4–13)
BUN SERPL-MCNC: 15 MG/DL (ref 5–25)
CALCIUM SERPL-MCNC: 8.3 MG/DL (ref 8.3–10.1)
CHLORIDE SERPL-SCNC: 109 MMOL/L (ref 100–108)
CO2 SERPL-SCNC: 26 MMOL/L (ref 21–32)
CREAT SERPL-MCNC: 0.64 MG/DL (ref 0.6–1.3)
ERYTHROCYTE [DISTWIDTH] IN BLOOD BY AUTOMATED COUNT: 15.5 % (ref 11.6–15.1)
GFR SERPL CREATININE-BSD FRML MDRD: 94 ML/MIN/1.73SQ M
GLUCOSE SERPL-MCNC: 92 MG/DL (ref 65–140)
HCT VFR BLD AUTO: 24.9 % (ref 36.5–49.3)
HGB BLD-MCNC: 8.3 G/DL (ref 12–17)
HGB BLD-MCNC: 8.4 G/DL (ref 12–17)
HGB BLD-MCNC: 9 G/DL (ref 12–17)
MCH RBC QN AUTO: 31.3 PG (ref 26.8–34.3)
MCHC RBC AUTO-ENTMCNC: 33.3 G/DL (ref 31.4–37.4)
MCV RBC AUTO: 94 FL (ref 82–98)
PLATELET # BLD AUTO: 158 THOUSANDS/UL (ref 149–390)
PMV BLD AUTO: 11.5 FL (ref 8.9–12.7)
POTASSIUM SERPL-SCNC: 3.8 MMOL/L (ref 3.5–5.3)
RBC # BLD AUTO: 2.65 MILLION/UL (ref 3.88–5.62)
SODIUM SERPL-SCNC: 142 MMOL/L (ref 136–145)
WBC # BLD AUTO: 6.64 THOUSAND/UL (ref 4.31–10.16)

## 2018-02-21 PROCEDURE — 99232 SBSQ HOSP IP/OBS MODERATE 35: CPT | Performed by: HOSPITALIST

## 2018-02-21 PROCEDURE — 99232 SBSQ HOSP IP/OBS MODERATE 35: CPT | Performed by: INTERNAL MEDICINE

## 2018-02-21 PROCEDURE — C9113 INJ PANTOPRAZOLE SODIUM, VIA: HCPCS | Performed by: INTERNAL MEDICINE

## 2018-02-21 PROCEDURE — 85018 HEMOGLOBIN: CPT | Performed by: PHYSICIAN ASSISTANT

## 2018-02-21 PROCEDURE — 85018 HEMOGLOBIN: CPT | Performed by: INTERNAL MEDICINE

## 2018-02-21 PROCEDURE — 80048 BASIC METABOLIC PNL TOTAL CA: CPT | Performed by: INTERNAL MEDICINE

## 2018-02-21 PROCEDURE — 85027 COMPLETE CBC AUTOMATED: CPT | Performed by: INTERNAL MEDICINE

## 2018-02-21 RX ADMIN — SODIUM CHLORIDE 150 ML/HR: 0.9 INJECTION, SOLUTION INTRAVENOUS at 07:30

## 2018-02-21 RX ADMIN — SODIUM CHLORIDE 150 ML/HR: 0.9 INJECTION, SOLUTION INTRAVENOUS at 00:37

## 2018-02-21 RX ADMIN — SODIUM CHLORIDE 8 MG/HR: 9 INJECTION, SOLUTION INTRAVENOUS at 16:12

## 2018-02-21 RX ADMIN — SODIUM CHLORIDE 8 MG/HR: 9 INJECTION, SOLUTION INTRAVENOUS at 02:16

## 2018-02-21 RX ADMIN — SODIUM CHLORIDE 150 ML/HR: 0.9 INJECTION, SOLUTION INTRAVENOUS at 15:36

## 2018-02-21 NOTE — PLAN OF CARE
DISCHARGE PLANNING     Discharge to home or other facility with appropriate resources Progressing        HEMATOLOGIC - ADULT     Maintains hematologic stability Progressing        Knowledge Deficit     Patient/family/caregiver demonstrates understanding of disease process, treatment plan, medications, and discharge instructions Progressing        MUSCULOSKELETAL - ADULT     Maintain proper alignment of affected body part Progressing        PAIN - ADULT     Verbalizes/displays adequate comfort level or baseline comfort level Progressing        Potential for Falls     Patient will remain free of falls Progressing

## 2018-02-21 NOTE — PROGRESS NOTES
Progress Note - Jamir Bass 68 y o  male MRN: [id number]    Unit/Bed#: [facility] [address] 87 223-01 Encounter: [id number]      Assessment/Plan:    1-melena secondary to bleeding duodenal ulcer  Status post cauterization, epinephrine injection and hemoclip placement  Patient on a full liquid diet  On a Protonix drip  Will continue to monitor H&H q 6 hours  Biopsies taken for H pylori which will be available in a week's time  Patient remains hemodynamically stable  If remains stable plan to switch to IV Protonix and subsequently to oral Protonix and hopefully DC Friday      2-acute blood loss anemia secondary to 1-hemoglobin currently stable at 8 6  Will continue to monitor and transfuse p r n  for hemoglobin less than 7      3-carotid artery disease status post endarterectomy  -aspirin on hold     4-fracture left humerus-patient was scheduled for surgery tomorrow but secondary to the bleed this will be postponed  Left arm in sling      5-ongoing tobacco use  Counseled with regards to cessation  Patient pre contemplative  On a nicotine patch         Subjective:  Patient feels well  No further episodes of bleeding  Will advance diet to pureed  Continue to monitor hemoglobin  Will change to IV Protonix from tomorrow  Physical Exam:   Vitals: Blood pressure 139/66, pulse 76, temperature 98 4 °F (36 9 °C), temperature source Tympanic, resp  rate 18, SpO2 97 %  ,There is no height or weight on file to calculate BMI  Gen:  Pleasant, non-tachypnic, non-dyspnic  Conversant  Heart: regular rate and rhythm, S1S2 present, no murmur, rub or gallop  Lungs: clear to ausculatation bilaterally  No wheezing, crackless, or rhonchi  No accessory muscle use or respiratory distress  Abd: soft, non-tender, non-distended  NABS, no guarding, rebound or peritoneal signs  Extremities: no clubbing, cyanosis or edema  2+pedal pulses bilaterally  Full range of motion  Neuro: awake, alert and oriented   Cranial nerves 2-12 intact  Strength and sensation grossly intact  Skin: warm and dry: no petechiae, purpura and rash  LABS:     Results from last 7 days  Lab Units 02/21/18  0610 02/21/18  0037 02/20/18  1933 02/20/18  1255 02/20/18  0531  02/19/18  1624   WBC Thousand/uL 6 64  --   --   --  7 52  --  10 43*   HEMOGLOBIN g/dL 8 3* 8 4* 8 7* 8 6* 8 7*  8 7*  < > 7 3*   HEMATOCRIT % 24 9*  --   --  24 9* 25 8*  25 8*  < > 21 3*   PLATELETS Thousands/uL 158  --   --   --  154  --  191   < > = values in this interval not displayed      Results from last 7 days  Lab Units 02/21/18  0610 02/20/18  0531 02/19/18  1624   SODIUM mmol/L 142 141 140   POTASSIUM mmol/L 3 8 3 9 4 3   CHLORIDE mmol/L 109* 107 105   CO2 mmol/L 26 26 26   BUN mg/dL 15 29* 37*   CREATININE mg/dL 0 64 0 76 0 86   GLUCOSE RANDOM mg/dL 92 122 110   CALCIUM mg/dL 8 3 8 6 8 7       Intake/Output Summary (Last 24 hours) at 02/21/18 1603  Last data filed at 02/21/18 0037   Gross per 24 hour   Intake             1000 ml   Output                0 ml   Net             1000 ml           Current Facility-Administered Medications:  acetaminophen 650 mg Oral Q6H PRN Marissa Taylor MD    nicotine 1 patch Transdermal Daily Marissa Taylor MD    ondansetron 4 mg Intravenous Q4H PRN Marissa Taylor MD    pantoprozole (PROTONIX) infusion (Continuous) 8 mg/hr Intravenous Continuous Ayla Cote MD Last Rate: 8 mg/hr (02/21/18 0216)   polyethylene glycol 17 g Oral Daily Marissa Taylor MD    sodium chloride 150 mL/hr Intravenous Continuous Marissa Taylor MD Last Rate: 150 mL/hr (02/21/18 1536)       Family update-wife in the room-updated

## 2018-02-21 NOTE — PROGRESS NOTES
Progress Note - Lethea Mooring 68 y o  male MRN: [id number]    Unit/Bed#: [facility] 2 -01 Encounter: [id number]    Subjective:     Patient seen and examined at bedside  He reports feeling well this morning  He denies abdominal pain  No bowel movements or bleeding overnight  Objective:     Vitals: Blood pressure 164/76, pulse 80, temperature (!) 97 4 °F (36 3 °C), temperature source Tympanic, resp  rate 19, SpO2 97 %  ,There is no height or weight on file to calculate BMI  Intake/Output Summary (Last 24 hours) at 02/21/18 0933  Last data filed at 02/21/18 0037   Gross per 24 hour   Intake             1300 ml   Output                0 ml   Net             1300 ml       Physical Exam:     General Appearance: Alert, appears stated age and cooperative  Lungs: Clear to auscultation bilaterally, no rales or rhonchi, no labored breathing/accessory muscle use  Heart: Regular rate and rhythm, S1, S2 normal, + systolic murmur  Abdomen: Soft, non-tender, non-distended; bowel sounds normal; no masses or no organomegaly  Extremities: No cyanosis, edema    Invasive Devices     Peripheral Intravenous Line            Peripheral IV 02/19/18 Right Antecubital 1 day    Peripheral IV 02/19/18 Right Wrist 1 day                Lab Results:    Results from last 7 days  Lab Units 02/21/18  0610  02/19/18  1624   WBC Thousand/uL 6 64  < > 10 43*   HEMOGLOBIN g/dL 8 3*  < > 7 3*   HEMATOCRIT % 24 9*  < > 21 3*   PLATELETS Thousands/uL 158  < > 191   NEUTROS PCT %  --   --  64   LYMPHS PCT %  --   --  27   MONOS PCT %  --   --  8   EOS PCT %  --   --  1   < > = values in this interval not displayed      Results from last 7 days  Lab Units 02/21/18  0610  02/19/18  1624   SODIUM mmol/L 142  < > 140   POTASSIUM mmol/L 3 8  < > 4 3   CHLORIDE mmol/L 109*  < > 105   CO2 mmol/L 26  < > 26   BUN mg/dL 15  < > 37*   CREATININE mg/dL 0 64  < > 0 86   CALCIUM mg/dL 8 3  < > 8 7   TOTAL PROTEIN g/dL  --   --  6 3*   BILIRUBIN TOTAL mg/dL  -- --  0 53   ALK PHOS U/L  --   --  45*   ALT U/L  --   --  19   AST U/L  --   --  21   GLUCOSE RANDOM mg/dL 92  < > 110   < > = values in this interval not displayed  Results from last 7 days  Lab Units 02/19/18  1624   INR  1 12           Imaging Studies: I have personally reviewed pertinent imaging studies  No results found  Assessment and Plan:    51-year-old male with history of carotid artery disease on aspirin, aortic stenosis, hyperlipidemia, and GI bleeding presenting for evaluation of melena     1) Upper GI bleeding 2/2 duodenal ulcer: status post EGD 2/20/18 which showed a 10 mm ulcer with actively bleeding visible vessel in the duodenal sweep status post cauterization, injection of epinephrine, and clip placement  Hemostasis was achieved  He did not require blood transfusion overnight  Hemoglobin stable at 8 3  No bowel movements or bleeding overnight      -Advance to clear liquids today  -Continue Protonix gtt x 72 hours  -Monitor H&H every 8 hours for now  -Avoid NSAIDs    2) Erosive gastritis: Identified during EGD yesterday   Biopsy taken to rule out H pylori infection     -Await biopsy results  -Continue Protonix

## 2018-02-22 LAB
ANION GAP SERPL CALCULATED.3IONS-SCNC: 9 MMOL/L (ref 4–13)
BUN SERPL-MCNC: 11 MG/DL (ref 5–25)
CALCIUM SERPL-MCNC: 8.6 MG/DL (ref 8.3–10.1)
CHLORIDE SERPL-SCNC: 107 MMOL/L (ref 100–108)
CO2 SERPL-SCNC: 25 MMOL/L (ref 21–32)
CREAT SERPL-MCNC: 0.77 MG/DL (ref 0.6–1.3)
ERYTHROCYTE [DISTWIDTH] IN BLOOD BY AUTOMATED COUNT: 15.7 % (ref 11.6–15.1)
GFR SERPL CREATININE-BSD FRML MDRD: 88 ML/MIN/1.73SQ M
GLUCOSE SERPL-MCNC: 117 MG/DL (ref 65–140)
HCT VFR BLD AUTO: 27 % (ref 36.5–49.3)
HGB BLD-MCNC: 9.1 G/DL (ref 12–17)
MCH RBC QN AUTO: 31.9 PG (ref 26.8–34.3)
MCHC RBC AUTO-ENTMCNC: 33.7 G/DL (ref 31.4–37.4)
MCV RBC AUTO: 95 FL (ref 82–98)
PLATELET # BLD AUTO: 180 THOUSANDS/UL (ref 149–390)
PMV BLD AUTO: 11.5 FL (ref 8.9–12.7)
POTASSIUM SERPL-SCNC: 3.7 MMOL/L (ref 3.5–5.3)
RBC # BLD AUTO: 2.85 MILLION/UL (ref 3.88–5.62)
SODIUM SERPL-SCNC: 141 MMOL/L (ref 136–145)
WBC # BLD AUTO: 6.37 THOUSAND/UL (ref 4.31–10.16)

## 2018-02-22 PROCEDURE — C9113 INJ PANTOPRAZOLE SODIUM, VIA: HCPCS | Performed by: INTERNAL MEDICINE

## 2018-02-22 PROCEDURE — 99232 SBSQ HOSP IP/OBS MODERATE 35: CPT | Performed by: INTERNAL MEDICINE

## 2018-02-22 PROCEDURE — 85027 COMPLETE CBC AUTOMATED: CPT | Performed by: INTERNAL MEDICINE

## 2018-02-22 PROCEDURE — 80048 BASIC METABOLIC PNL TOTAL CA: CPT | Performed by: INTERNAL MEDICINE

## 2018-02-22 PROCEDURE — C9113 INJ PANTOPRAZOLE SODIUM, VIA: HCPCS | Performed by: HOSPITALIST

## 2018-02-22 PROCEDURE — 99232 SBSQ HOSP IP/OBS MODERATE 35: CPT | Performed by: HOSPITALIST

## 2018-02-22 RX ORDER — PANTOPRAZOLE SODIUM 40 MG/1
40 INJECTION, POWDER, FOR SOLUTION INTRAVENOUS EVERY 12 HOURS SCHEDULED
Status: DISCONTINUED | OUTPATIENT
Start: 2018-02-22 | End: 2018-02-23 | Stop reason: HOSPADM

## 2018-02-22 RX ADMIN — PANTOPRAZOLE SODIUM 40 MG: 40 INJECTION, POWDER, FOR SOLUTION INTRAVENOUS at 21:39

## 2018-02-22 RX ADMIN — SODIUM CHLORIDE 8 MG/HR: 9 INJECTION, SOLUTION INTRAVENOUS at 02:49

## 2018-02-22 RX ADMIN — PANTOPRAZOLE SODIUM 40 MG: 40 INJECTION, POWDER, FOR SOLUTION INTRAVENOUS at 13:32

## 2018-02-22 NOTE — PROGRESS NOTES
Progress Note - Rosendo Mitchell 68 y o  male MRN: [id number]    Unit/Bed#: [facility] 68 2 -01 Encounter: [id number]      Assessment/Plan:    1-melena secondary to bleeding duodenal ulcer   Status post cauterization, epinephrine injection and hemoclip placement  Now on a regular diet -non ulcergenic     On a Protonix drip  Hemoglobin remained stable at 9 1    Biopsies taken for H pylori which will be available in a week's time  Deepthi Ga remains hemodynamically stable  If remains stable plan to switch to IV Protonix and subsequently to oral Protonix and hopefully DC Friday      2-acute blood loss anemia secondary to 1-hemoglobin currently stable at 9 1   Will continue to monitor and transfuse p r n  for hemoglobin less than 7      3-carotid artery disease status post endarterectomy  -aspirin on hold     4-fracture left humerus-patient was scheduled for surgery tomorrow but secondary to the bleed this will be postponed   Left arm in sling      5-ongoing tobacco use   Counseled with regards to cessation   Patient pre contemplative   On a nicotine patch    Anticipate discharge home Friday  Subjective:  Patient doing well  Had a dark formed black stool  No bleeding  Hemoglobin has come up to 9 1  Patient to be transitioned to a regular diet today  If he tolerates that he could be discharged home tomorrow  Hold Xarelto for a week    Physical Exam:   Vitals: Blood pressure 121/65, pulse 79, temperature 98 3 °F (36 8 °C), temperature source Tympanic, resp  rate 19, SpO2 98 %  ,There is no height or weight on file to calculate BMI  Gen:  Pleasant, non-tachypnic, non-dyspnic  Conversant  Heart: regular rate and rhythm, S1S2 present, no murmur, rub or gallop  Lungs: clear to ausculatation bilaterally  No wheezing, crackless, or rhonchi  No accessory muscle use or respiratory distress  Abd: soft, non-tender, non-distended  NABS, no guarding, rebound or peritoneal signs    Extremities: no clubbing, cyanosis or edema  2+pedal pulses bilaterally  Full range of motion  Neuro: awake, alert and oriented  Cranial nerves 2-12 intact  Strength and sensation grossly intact  Skin: warm and dry: no petechiae, purpura and rash  LABS:     Results from last 7 days  Lab Units 02/22/18  0534 02/21/18  1614 02/21/18  0610  02/20/18  1255 02/20/18  0531   WBC Thousand/uL 6 37  --  6 64  --   --  7 52   HEMOGLOBIN g/dL 9 1* 9 0* 8 3*  < > 8 6* 8 7*  8 7*   HEMATOCRIT % 27 0*  --  24 9*  --  24 9* 25 8*  25 8*   PLATELETS Thousands/uL 180  --  158  --   --  154   < > = values in this interval not displayed      Results from last 7 days  Lab Units 02/22/18  0534 02/21/18  0610 02/20/18  0531   SODIUM mmol/L 141 142 141   POTASSIUM mmol/L 3 7 3 8 3 9   CHLORIDE mmol/L 107 109* 107   CO2 mmol/L 25 26 26   BUN mg/dL 11 15 29*   CREATININE mg/dL 0 77 0 64 0 76   GLUCOSE RANDOM mg/dL 117 92 122   CALCIUM mg/dL 8 6 8 3 8 6       Intake/Output Summary (Last 24 hours) at 02/22/18 1304  Last data filed at 02/21/18 1736   Gross per 24 hour   Intake           2547 5 ml   Output                0 ml   Net           2547 5 ml           Current Facility-Administered Medications:  acetaminophen 650 mg Oral Q6H PRN Marisas Taylor MD    nicotine 1 patch Transdermal Daily Marissa Taylor MD    ondansetron 4 mg Intravenous Q4H PRN Marissa Taylor MD    pantoprozole (PROTONIX) infusion (Continuous) 8 mg/hr Intravenous Continuous Jacquie Bravo MD Last Rate: 8 mg/hr (02/22/18 0249)   polyethylene glycol 17 g Oral Daily Marissa Taylor MD

## 2018-02-22 NOTE — SOCIAL WORK
CM met with patient at bedside to address discharge needs  Present was patients wife, Lisset Chinchilla; CM requested permission to speak, which was granted  Patient lives in a townhouse with wife; bedroom is located on 2nd floor, patient denies difficulty with steps  Patient is independent with ADLs and functional mobility  Support system is wife, children, and g-children  Food shopping is done by both & meal prep is done by wife  Patient denies use of DME  Patient is able to ambulate without assistance  No hx of VNA reported  Patient is employed  PCP identified as nahomy Ortiz, and primary/designated caregiver is wife  Patient uses [facility] on [facility]  for Rx needs; patient made aware of [facility] to use at discharge if needed  Patient does drive; reports wife available at discharge to transport home  Help/support reported  Patient made aware of CM's name, number and role  CM to follow as case progresses and needs are determined

## 2018-02-22 NOTE — PROGRESS NOTES
Progress Note - Rosendo Mitchell 68 y o  male MRN: [id number]    Unit/Bed#: [place] 68 2 -01 Encounter: [id number]    Subjective:     Patient seen and examined at bedside  He had a dark formed BM last night  No gross blood reported  He denies abdominal pain  He is tolerating diet  Objective:     Vitals: Blood pressure 121/65, pulse 79, temperature 98 3 °F (36 8 °C), temperature source Tympanic, resp  rate 19, SpO2 98 %  ,There is no height or weight on file to calculate BMI  Intake/Output Summary (Last 24 hours) at 02/22/18 1027  Last data filed at 02/21/18 1736   Gross per 24 hour   Intake           2547 5 ml   Output                0 ml   Net           2547 5 ml       Physical Exam:     General Appearance: Alert, appears stated age and cooperative  Lungs: Clear to auscultation bilaterally, no rales or rhonchi, no labored breathing/accessory muscle use  Heart: Regular rate and rhythm, S1, S2 normal, + murmur  Abdomen: Soft, non-tender, non-distended; bowel sounds normal; no masses or no organomegaly  Extremities: No cyanosis, edema    Invasive Devices     Peripheral Intravenous Line            Peripheral IV 02/19/18 Right Antecubital 2 days    Peripheral IV 02/19/18 Right Wrist 2 days                Lab Results:    Results from last 7 days  Lab Units 02/22/18  0534  02/19/18  1624   WBC Thousand/uL 6 37  < > 10 43*   HEMOGLOBIN g/dL 9 1*  < > 7 3*   HEMATOCRIT % 27 0*  < > 21 3*   PLATELETS Thousands/uL 180  < > 191   NEUTROS PCT %  --   --  64   LYMPHS PCT %  --   --  27   MONOS PCT %  --   --  8   EOS PCT %  --   --  1   < > = values in this interval not displayed      Results from last 7 days  Lab Units 02/22/18  0534  02/19/18  1624   SODIUM mmol/L 141  < > 140   POTASSIUM mmol/L 3 7  < > 4 3   CHLORIDE mmol/L 107  < > 105   CO2 mmol/L 25  < > 26   BUN mg/dL 11  < > 37*   CREATININE mg/dL 0 77  < > 0 86   CALCIUM mg/dL 8 6  < > 8 7   TOTAL PROTEIN g/dL  --   --  6 3*   BILIRUBIN TOTAL mg/dL  --   --  0 53 ALK PHOS U/L  --   --  45*   ALT U/L  --   --  19   AST U/L  --   --  21   GLUCOSE RANDOM mg/dL 117  < > 110   < > = values in this interval not displayed  Results from last 7 days  Lab Units 02/19/18  1624   INR  1 12           Imaging Studies: I have personally reviewed pertinent imaging studies  No results found  Assessment and Plan:    1) Upper GI bleeding 2/2 duodenal ulcer: status post EGD 2/20/18 which showed a 10 mm ulcer with actively bleeding visible vessel in the duodenal sweep status post cauterization, injection of epinephrine, and clip placement  Hemoglobin has remained stable at 9 1  No active bleeding reported      -Advance to regular non-ulcerogenic diet   -Monitor H&H   -If hemoglobin remains stable tomorrow, can D/C Protonix drip and switch to twice daily dosing  -Avoid NSAIDs     2) Erosive gastritis: Identified during EGD  Biopsy taken to rule out H pylori infection       -Await biopsy results  -Continue Protonix

## 2018-02-22 NOTE — PLAN OF CARE

## 2018-02-23 VITALS
HEART RATE: 85 BPM | RESPIRATION RATE: 19 BRPM | TEMPERATURE: 98.3 F | OXYGEN SATURATION: 97 % | SYSTOLIC BLOOD PRESSURE: 127 MMHG | DIASTOLIC BLOOD PRESSURE: 60 MMHG

## 2018-02-23 PROBLEM — K92.2 UPPER GI BLEED: Status: RESOLVED | Noted: 2018-02-19 | Resolved: 2018-02-23

## 2018-02-23 PROBLEM — K92.2 GI BLEED: Status: RESOLVED | Noted: 2018-02-19 | Resolved: 2018-02-23

## 2018-02-23 LAB
ERYTHROCYTE [DISTWIDTH] IN BLOOD BY AUTOMATED COUNT: 15.9 % (ref 11.6–15.1)
HCT VFR BLD AUTO: 26.5 % (ref 36.5–49.3)
HGB BLD-MCNC: 8.8 G/DL (ref 12–17)
MCH RBC QN AUTO: 31.8 PG (ref 26.8–34.3)
MCHC RBC AUTO-ENTMCNC: 33.2 G/DL (ref 31.4–37.4)
MCV RBC AUTO: 96 FL (ref 82–98)
PLATELET # BLD AUTO: 181 THOUSANDS/UL (ref 149–390)
PMV BLD AUTO: 11.8 FL (ref 8.9–12.7)
RBC # BLD AUTO: 2.77 MILLION/UL (ref 3.88–5.62)
WBC # BLD AUTO: 5.96 THOUSAND/UL (ref 4.31–10.16)

## 2018-02-23 PROCEDURE — C9113 INJ PANTOPRAZOLE SODIUM, VIA: HCPCS | Performed by: HOSPITALIST

## 2018-02-23 PROCEDURE — 99232 SBSQ HOSP IP/OBS MODERATE 35: CPT | Performed by: HOSPITALIST

## 2018-02-23 PROCEDURE — 85027 COMPLETE CBC AUTOMATED: CPT | Performed by: HOSPITALIST

## 2018-02-23 PROCEDURE — 99232 SBSQ HOSP IP/OBS MODERATE 35: CPT | Performed by: PHYSICIAN ASSISTANT

## 2018-02-23 RX ORDER — PANTOPRAZOLE SODIUM 40 MG/1
40 TABLET, DELAYED RELEASE ORAL 2 TIMES DAILY
Qty: 60 TABLET | Refills: 1 | Status: SHIPPED | OUTPATIENT
Start: 2018-02-23 | End: 2018-04-26 | Stop reason: SDUPTHER

## 2018-02-23 RX ORDER — ASPIRIN 81 MG/1
81 TABLET, CHEWABLE ORAL DAILY
Refills: 0
Start: 2018-02-23

## 2018-02-23 RX ORDER — NICOTINE 21 MG/24HR
1 PATCH, TRANSDERMAL 24 HOURS TRANSDERMAL DAILY
Qty: 28 PATCH | Refills: 0 | Status: SHIPPED | OUTPATIENT
Start: 2018-02-24 | End: 2018-11-15 | Stop reason: ALTCHOICE

## 2018-02-23 RX ADMIN — PANTOPRAZOLE SODIUM 40 MG: 40 INJECTION, POWDER, FOR SOLUTION INTRAVENOUS at 08:32

## 2018-02-23 NOTE — PROGRESS NOTES
Discharge Summary - John Marquis 68 y o  male MRN: [id number]    [facility] 68 2 MED SURG Room / Bed: [place] [address] 2 /[place] 2 M* Encounter: [id number]    BRIEF OVERVIEW      Admission Date: 2/19/2018       Discharge Date:  02/23/2018    Admitting Diagnosis:  Upper GI bleed    Primary Discharge Diagnosis  Principal Problem:  upper GI bleed  Bleeding duodenal ulcer  Active Problems:    Acute blood loss anemia    Coronary artery disease  Tobacco use  Fracture left humerus  Service:  [facility]    Consulting Providers   GI-Dr Sriram Quinones    Procedures Performed   EGD   #1  Esophagus-normal esophagus     #2  Stomach-mild erosive gastritis in the antrum  Two cold biopsies were taken to rule out H pylori infection  Bright red blood noted in the antrum, bulb and 2nd portion of the duodenum  #3  Duodenum-A  10 mm ulcer with actively bleeding visible vessel was noted in the duodenal sweep  The vessel was cauterized using gold probe  Then I injected 1 in 10,000 epinephrine in 1 mL aliquots  A total of 8 mL injected  Then I placed 2 hemoclips  Hemostasis achieved        Assessment and plan on date of discharge  1-melena secondary to bleeding duodenal ulcer   Status post cauterization, epinephrine injection and hemoclip placement   Patient on a full liquid diet    On a Protonix drip   Will continue to monitor H&H q 6 hours   Biopsies taken for H pylori which will be available in a week's time   Patient remains hemodynamically stable  If remains stable plan to switch to IV Protonix and subsequently to oral Protonix and hopefully DC Friday      2-acute blood loss anemia secondary to 1-hemoglobin currently stable at 8 6   Will continue to monitor and transfuse p r n  for hemoglobin less than 7      3-carotid artery disease status post endarterectomy  -aspirin on hold     4-fracture left humerus-patient was scheduled for surgery tomorrow but secondary to the bleed this will be postponed   Left arm in sling      5-ongoing tobacco use   Counseled with regards to cessation   Patient pre contemplative   On a nicotine patch       Discharge Condition: Improved    Discharge Disposition:  Home  Discharge summary:     Lisa Servin is an extremely pleasant 60-year-old male with a prior medical history of aortic stenosis, coronary artery disease on aspirin-who was admitted to [facility] on 02/19/2018 with melena  The patient had a prior history of GI bleed in 2013 and was evaluated extensively including a capsule enteroscopy -he states that he did have an ulcer which required cauterization at that time  One week prior he sustained a mechanical fall and fractured his left humerus  He was due to have surgery and had blood work as part of pre surgery testing which revealed a hemoglobin of 4  However in the ED his hemoglobin was noted to be 7 3 which is down from his baseline of 14 in June of 2017  He underwent an EGD which revealed a bleeding duodenal ulcer which was cauterized with epinephrine and a hemoclip placed  The patient was maintained on a Protonix drip  His hemoglobin is now stable at 8 8  There is no further episodes of bleeding  He has been transitioned to the non ulcer erosion and diet  Biopsies of the ulcer had been taken this will be reviewed with him on follow-up in a week's time  He has been asked to hold his aspirin and restarted in a week's time  Should there be any further recurrences of bleeding he has been asked to come back to the ER immediately  He will be on Protonix 40 mg twice a day for at least 2 months  He remains stable for discharge           Discharge Medications   Please see Medical Reconciliation Discharge Form    Discharge Follow Up Appointments:   PCP  GI doctor Julianna    Discharge  Statement   Total Time Spent today including physical exam, discussion with patient and family, and discharge arrangements/care 41 minutes

## 2018-02-23 NOTE — PROGRESS NOTES
Progress Note - Singh Paul 68 y o  male MRN: [id number]    Unit/Bed#: [place] 2 -01 Encounter: [id number]      Assessment/Plan:  1-melena secondary to bleeding duodenal ulcer   Status post cauterization, epinephrine injection and hemoclip placement  Now on a regular diet -non ulcergenic     On a Protonix drip  This will be switched to oral Protonix 40 mg twice a day  Hemoglobin remained stable at 8 8   Biopsies taken for H pylori which will be available in a week's time  Vladimir Lucas remains hemodynamically stable      2-acute blood loss anemia secondary to 1-hemoglobin currently stable at 8 8   Will continue to monitor and transfuse p r n  for hemoglobin less than 7      3-carotid artery disease status post endarterectomy  -aspirin on hold-restart in 1 weeks time     4-fracture left humerus-patient was scheduled for surgery tomorrow but secondary to the bleed this will be postponed   Left arm in sling      5-ongoing tobacco use   Counseled with regards to cessation   Patient pre contemplative   On a nicotine patch     Anticipate discharge home today  Patient has an appointment with PCP in 5 days time and will repeat a CBC at that time  Subjective:  Patient doing well  No further episodes of melena  Hemoglobin stable  Recommended Protonix 40 mg twice a day for 2 months at least   Restart aspirin in 1 weeks time  To follow up with GI as an outpatient  Physical Exam:   Vitals: Blood pressure 127/60, pulse 85, temperature 98 3 °F (36 8 °C), temperature source Tympanic, resp  rate 19, SpO2 97 %  ,There is no height or weight on file to calculate BMI  Gen:  Pleasant, non-tachypnic, non-dyspnic  Conversant  Heart: regular rate and rhythm, S1S2 present, no murmur, rub or gallop  Lungs: clear to ausculatation bilaterally  No wheezing, crackless, or rhonchi  No accessory muscle use or respiratory distress  Abd: soft, non-tender, non-distended   NABS, no guarding, rebound or peritoneal signs   Extremities: no clubbing, cyanosis or edema  2+pedal pulses bilaterally  Full range of motion  Neuro: awake, alert and oriented  Cranial nerves 2-12 intact  Strength and sensation grossly intact  Skin: warm and dry: no petechiae, purpura and rash      LABS:     Results from last 7 days  Lab Units 02/23/18  0624 02/22/18  0534 02/21/18  1614 02/21/18  0610   WBC Thousand/uL 5 96 6 37  --  6 64   HEMOGLOBIN g/dL 8 8* 9 1* 9 0* 8 3*   HEMATOCRIT % 26 5* 27 0*  --  24 9*   PLATELETS Thousands/uL 181 180  --  158       Results from last 7 days  Lab Units 02/22/18  0534 02/21/18  0610 02/20/18  0531   SODIUM mmol/L 141 142 141   POTASSIUM mmol/L 3 7 3 8 3 9   CHLORIDE mmol/L 107 109* 107   CO2 mmol/L 25 26 26   BUN mg/dL 11 15 29*   CREATININE mg/dL 0 77 0 64 0 76   GLUCOSE RANDOM mg/dL 117 92 122   CALCIUM mg/dL 8 6 8 3 8 6     No intake or output data in the 24 hours ending 02/23/18 8812        Current Facility-Administered Medications:  acetaminophen 650 mg Oral Q6H PRN Marissa Taylor MD   nicotine 1 patch Transdermal Daily Marissa Taylor MD   ondansetron 4 mg Intravenous Q4H PRN Marissa Taylor MD   pantoprazole 40 mg Intravenous Q12H 93 Doug Heredia MD   polyethylene glycol 17 g Oral Daily Marissa Taylor MD

## 2018-02-23 NOTE — PROGRESS NOTES
Progress Note - Zena Starch 68 y o  male MRN: [id number]    Unit/Bed#: [facility] 68 2 [place] 223-01 Encounter: [id number]      ASSESSMENT/ PLAN:   1  Upper Gi bleeding: secondary to duodenal ulcer seen on EGD 2/20/18, with actively bleeding vessel in duodenal sweep requiring cauterization, epi injection and clip placement  Hgb remains stable  He is tolerating regular diet  -continue PPI BID x 8 weeks, then daily indefinitely   -restart ASA after 1 week  -avoid NSAIDs  -discussed reflux diet   -okay for discharge from GI standpoint, discussed with primary     2  Erosive gastritis: EGD with gastritis, bx negative for h pylori or intestinal dysplasia or metaplasia  -continue PPI therapy as above  -decrease caffeine and alcohol intake, avoid NSAID use    Subjective:     Patient seen and examined, no complaints except for poor sleep     Objective:     Vitals: Blood pressure 127/60, pulse 85, temperature 98 3 °F (36 8 °C), temperature source Tympanic, resp  rate 19, SpO2 97 %  ,There is no height or weight on file to calculate BMI      No intake or output data in the 24 hours ending 02/23/18 1109    Physical Exam:     General Appearance: A&Ox3, appears stated age and cooperative  Lungs: Clear to auscultation bilaterally, no rales or rhonchi  Heart: Regular rate and rhythm, S1, S2 normal, no murmur, click, rub or gallop  Abdomen: Soft, non-tender, non-distended; bowel sounds normal; no masses or no organomegaly  Extremities: No cyanosis, clubbing, edema, left arm sling noted    Invasive Devices          No matching active lines, drains, or airways          Lab Results:      Results from last 7 days  Lab Units 02/23/18  0624  02/19/18  1624   WBC Thousand/uL 5 96  < > 10 43*   HEMOGLOBIN g/dL 8 8*  < > 7 3*   HEMATOCRIT % 26 5*  < > 21 3*   PLATELETS Thousands/uL 181  < > 191   NEUTROS PCT %  --   --  64   LYMPHS PCT %  --   --  27   MONOS PCT %  --   --  8   EOS PCT %  --   --  1   < > = values in this interval not displayed  Results from last 7 days  Lab Units 02/22/18  0534  02/19/18  1624   SODIUM mmol/L 141  < > 140   POTASSIUM mmol/L 3 7  < > 4 3   CHLORIDE mmol/L 107  < > 105   CO2 mmol/L 25  < > 26   BUN mg/dL 11  < > 37*   CREATININE mg/dL 0 77  < > 0 86   CALCIUM mg/dL 8 6  < > 8 7   TOTAL PROTEIN g/dL  --   --  6 3*   BILIRUBIN TOTAL mg/dL  --   --  0 53   ALK PHOS U/L  --   --  45*   ALT U/L  --   --  19   AST U/L  --   --  21   GLUCOSE RANDOM mg/dL 117  < > 110   < > = values in this interval not displayed  Results from last 7 days  Lab Units 02/19/18  1624   INR  1 12           Imaging Studies: I have personally reviewed pertinent imaging studies  No results found

## 2018-02-23 NOTE — PLAN OF CARE
DISCHARGE PLANNING     Discharge to home or other facility with appropriate resources Progressing        DISCHARGE PLANNING - CARE MANAGEMENT     Discharge to post-acute care or home with appropriate resources Progressing        HEMATOLOGIC - ADULT     Maintains hematologic stability Progressing        Knowledge Deficit     Patient/family/caregiver demonstrates understanding of disease process, treatment plan, medications, and discharge instructions Progressing        MUSCULOSKELETAL - ADULT     Maintain proper alignment of affected body part Progressing        PAIN - ADULT     Verbalizes/displays adequate comfort level or baseline comfort level Progressing        Potential for Falls     Patient will remain free of falls Progressing

## 2018-02-23 NOTE — DISCHARGE INSTR - AVS FIRST PAGE
Please get a CBC checked when you follow up with your primary care physician on Wednesday  3/1/2018

## 2018-02-23 NOTE — DISCHARGE SUMMARY
Discharge Summary - [address] 68 y o  male MRN: [id number]  [address] SURG Room / Bed: [place] [address] 2 [facility] 2 M* Encounter: [id number]     BRIEF OVERVIEW        Admission Date: 2/19/2018       Discharge Date:  02/23/2018     Admitting Diagnosis:  Upper GI bleed     Primary Discharge Diagnosis  Principal Problem:  upper GI bleed  Bleeding duodenal ulcer  Active Problems:    Acute blood loss anemia    Coronary artery disease  Tobacco use  Fracture left humerus  Service:  [facility]     Consulting Providers   GI-Dr Nubia Renee     Procedures Performed   EGD   #1  Esophagus-normal esophagus     #2  Stomach-mild erosive gastritis in the antrum   Two cold biopsies were taken to rule out H pylori infection  Bright red blood noted in the antrum, bulb and 2nd portion of the duodenum  #3  Duodenum-A  10 mm ulcer with actively bleeding visible vessel was noted in the duodenal sweep   The vessel was cauterized using gold probe   Then I injected 1 in 10,000 epinephrine in 1 mL aliquots   A total of 8 mL injected   Then I placed 2 hemoclips   Hemostasis achieved         Assessment and plan on date of discharge  1-melena secondary to bleeding duodenal ulcer   Status post cauterization, epinephrine injection and hemoclip placement   Patient on a full liquid diet    On a Protonix drip   Will continue to monitor H&H q 6 hours   Biopsies taken for H pylori which will be available in a week's time  Mahawa Jessica remains hemodynamically stable   If remains stable plan to switch to IV Protonix and subsequently to oral Protonix and hopefully DC Friday      2-acute blood loss anemia secondary to 1-hemoglobin currently stable at 8 6   Will continue to monitor and transfuse p r n  for hemoglobin less than 7      3-carotid artery disease status post endarterectomy  -aspirin on hold     4-fracture left humerus-patient was scheduled for surgery tomorrow but secondary to the bleed this will be postponed   Left arm in sling      5-ongoing tobacco use   Counseled with regards to cessation   Patient pre contemplative   On a nicotine patch        Discharge Condition: Improved     Discharge Disposition:  Home  Discharge summary:      Anthony Blanco is an extremely pleasant 49-year-old male with a prior medical history of aortic stenosis, coronary artery disease on aspirin-who was admitted to [facility] on 02/19/2018 with melena  The patient had a prior history of GI bleed in 2013 and was evaluated extensively including a capsule enteroscopy -he states that he did have an ulcer which required cauterization at that time  One week prior he sustained a mechanical fall and fractured his left humerus  He was due to have surgery and had blood work as part of pre surgery testing which revealed a hemoglobin of 4  However in the ED his hemoglobin was noted to be 7 3 which is down from his baseline of 14 in June of 2017  He underwent an EGD which revealed a bleeding duodenal ulcer which was cauterized with epinephrine and a hemoclip placed  The patient was maintained on a Protonix drip  His hemoglobin is now stable at 8 8  There is no further episodes of bleeding  He has been transitioned to the non ulcer erosion and diet  Biopsies of the ulcer had been taken this will be reviewed with him on follow-up in a week's time  He has been asked to hold his aspirin and restarted in a week's time  Should there be any further recurrences of bleeding he has been asked to come back to the ER immediately  He will be on Protonix 40 mg twice a day for at least 2 months    He remains stable for discharge             Discharge Medications   Please see Medical Reconciliation Discharge Form     Discharge Follow Up Appointments:   PCP  GI doctor Julianna     Discharge  Statement   Total Time Spent today including physical exam, discussion with patient and family, and discharge arrangements/care 39 minutes

## 2018-02-23 NOTE — PLAN OF CARE
Problem: Potential for Falls  Goal: Patient will remain free of falls  INTERVENTIONS:  - Assess patient frequently for physical needs  -  Identify cognitive and physical deficits and behaviors that affect risk of falls    -  [place] fall precautions as indicated by assessment   - Educate patient/family on patient safety including physical limitations  - Instruct patient to call for assistance with activity based on assessment  - Modify environment to reduce risk of injury  - Consider OT/PT consult to assist with strengthening/mobility   Outcome: Completed Date Met: 02/23/18      Problem: PAIN - ADULT  Goal: Verbalizes/displays adequate comfort level or baseline comfort level  Interventions:  - Encourage patient to monitor pain and request assistance  - Assess pain using appropriate pain scale  - Administer analgesics based on type and severity of pain and evaluate response  - Implement non-pharmacological measures as appropriate and evaluate response  - Consider cultural and social influences on pain and pain management  - Notify physician/advanced practitioner if interventions unsuccessful or patient reports new pain   Outcome: Completed Date Met: 02/23/18      Problem: DISCHARGE PLANNING  Goal: Discharge to home or other facility with appropriate resources  INTERVENTIONS:  - Identify barriers to discharge w/patient and caregiver  - Arrange for needed discharge resources and transportation as appropriate  - Identify discharge learning needs (meds, wound care, etc )  - Arrange for interpretive services to assist at discharge as needed  - Refer to Case Management Department for coordinating discharge planning if the patient needs post-hospital services based on physician/advanced practitioner order or complex needs related to functional status, cognitive ability, or social support system   Outcome: Completed Date Met: 02/23/18      Problem: Knowledge Deficit  Goal: Patient/family/caregiver demonstrates understanding of disease process, treatment plan, medications, and discharge instructions  Complete learning assessment and assess knowledge base    Interventions:  - Provide teaching at level of understanding  - Provide teaching via preferred learning methods   Outcome: Completed Date Met: 02/23/18      Problem: HEMATOLOGIC - ADULT  Goal: Maintains hematologic stability  INTERVENTIONS  - Assess for signs and symptoms of bleeding or hemorrhage  - Monitor labs  - Administer supportive blood products/factors as ordered and appropriate   Outcome: Completed Date Met: 02/23/18      Problem: MUSCULOSKELETAL - ADULT  Goal: Maintain proper alignment of affected body part  INTERVENTIONS:  - Support, maintain and protect limb and body alignment  - Provide pt/fam with appropriate education   Outcome: Completed Date Met: 02/23/18      Problem: DISCHARGE PLANNING - CARE MANAGEMENT  Goal: Discharge to post-acute care or home with appropriate resources  INTERVENTIONS:  - Conduct assessment to determine patient/family and health care team treatment goals, and need for post-acute services based on payer coverage, community resources, and patient preferences, and barriers to discharge  - Address psychosocial, clinical, and financial barriers to discharge as identified in assessment in conjunction with the patient/family and health care team  - Arrange appropriate level of post-acute services according to patients   needs and preference and payer coverage in collaboration with the physician and health care team  - Communicate with and update the patient/family, physician, and health care team regarding progress on the discharge plan  - Arrange appropriate transportation to post-acute venues   Outcome: Completed Date Met: 02/23/18

## 2018-04-26 ENCOUNTER — OFFICE VISIT (OUTPATIENT)
Dept: GASTROENTEROLOGY | Facility: MEDICAL CENTER | Age: 78
End: 2018-04-26
Payer: MEDICARE

## 2018-04-26 VITALS
DIASTOLIC BLOOD PRESSURE: 76 MMHG | TEMPERATURE: 98.7 F | WEIGHT: 235 LBS | HEART RATE: 76 BPM | SYSTOLIC BLOOD PRESSURE: 118 MMHG

## 2018-04-26 DIAGNOSIS — K92.0 GASTROINTESTINAL HEMORRHAGE WITH HEMATEMESIS: ICD-10-CM

## 2018-04-26 DIAGNOSIS — K26.9 DUODENAL ULCER: Primary | ICD-10-CM

## 2018-04-26 DIAGNOSIS — D62 ACUTE BLOOD LOSS ANEMIA: ICD-10-CM

## 2018-04-26 PROCEDURE — 99214 OFFICE O/P EST MOD 30 MIN: CPT | Performed by: PHYSICIAN ASSISTANT

## 2018-04-26 RX ORDER — PANTOPRAZOLE SODIUM 40 MG/1
40 TABLET, DELAYED RELEASE ORAL DAILY
Qty: 30 TABLET | Refills: 5 | Status: SHIPPED | OUTPATIENT
Start: 2018-04-26 | End: 2018-10-12 | Stop reason: SDUPTHER

## 2018-04-26 NOTE — LETTER
April 26, 2018     Jaime Gustafson MD  [facility] [address]    Patient: Danielle Luna   YOB: 1940   Date of Visit: 4/26/2018       Dear Dr Angela Arndt:    Thank you for referring Danielle Luna to me for evaluation  Below are my notes for this consultation  If you have questions, please do not hesitate to call me  I look forward to following your patient along with you  Sincerely,        Arvin Knowles PA-C        CC: No Recipients  Arvin Knowles Eduard Melena  4/26/2018  8:44 AM  Sign at close encounter  Assessment/Plan:     Diagnoses and all orders for this visit:    Duodenal ulcer/Acute blood loss anemia  Patient was admitted with melena and a decreased hemoglobin was found to have a duodenal ulcer  This was injected and clipped  He has been on pantoprazole b i d  since  Would recommend at this time to decrease to once a day  Since he requires baby aspirin would continue indefinitely at this point  He states his hemoglobin is stable and therefore would not recommend any further treatment at this time  -     pantoprazole (PROTONIX) 40 mg tablet; Take 1 tablet (40 mg total) by mouth daily    Will see him back on an as-needed basis  Subjective:      Patient ID: Danielle Luna is a 68 y o  male  HPI     This is a hospital follow-up for GI bleeding  Patient was seen in February when he presented with melena  He was found to have a decrease in hemoglobin from 14-7  He denied NSAIDs but was taking a baby aspirin daily  He underwent EGD which showed a 10 mm duodenal ulcer that was bleeding  It was injected and clipped  He did well after an on discharge his hemoglobin was 8 8  He states he did have a repeat hemoglobin at his PCPs however he is unsure of what the number was  He is still taking his pantoprazole twice a day  He denies any symptoms of nausea, vomiting, heartburn, abdominal pain or melena  He had a previous episode of GI bleeding back in 2013    He underwent endoscopy, colonoscopy and eventually enteroscopy to identify the source  Patient Active Problem List   Diagnosis    Acute blood loss anemia    Duodenal ulcer     No Known Allergies  Current Outpatient Prescriptions on File Prior to Visit   Medication Sig    aspirin (ASPIRIN 81) 81 mg chewable tablet Chew 1 tablet (81 mg total) daily    brimonidine (ALPHAGAN P) 0 15 % ophthalmic solution Administer 1 drop to the right eye 2 (two) times a day    carvedilol (COREG) 25 mg tablet Take 25 mg by mouth 2 (two) times a day    nicotine (NICODERM CQ) 14 mg/24hr TD 24 hr patch Place 1 patch on the skin daily    rosuvastatin (CRESTOR) 20 MG tablet Take 20 mg by mouth daily      tamsulosin (FLOMAX) 0 4 mg Take 1 capsule by mouth see administration instructions Monday, Wednesday, Friday    valsartan (DIOVAN) 320 MG tablet Take 1 tablet by mouth daily after dinner      [DISCONTINUED] pantoprazole (PROTONIX) 40 mg tablet Take 1 tablet (40 mg total) by mouth 2 (two) times a day     No current facility-administered medications on file prior to visit  History reviewed  No pertinent family history  Past Medical History:   Diagnosis Date    GI bleed     Heart murmur     Hyperlipidemia      Social History     Social History    Marital status: /Civil Union     Spouse name: N/A    Number of children: N/A    Years of education: N/A     Social History Main Topics    Smoking status: Former Smoker    Smokeless tobacco: Current User    Alcohol use No      Comment: 1 drink of vodka daily , last drink 5 days ago     Drug use: No    Sexual activity: Not Asked     Other Topics Concern    None     Social History Narrative    None     Past Surgical History:   Procedure Laterality Date    CAROTID ARTERY ANGIOPLASTY      COLONOSCOPY      ESOPHAGOGASTRODUODENOSCOPY N/A 2/20/2018    Procedure: ESOPHAGOGASTRODUODENOSCOPY (EGD) with gold probe, hemoclip, 7ml epi injection   and bx;  Surgeon: Rosie Wallace MD;  Location: [facility]; Service: Gastroenterology    UPPER GASTROINTESTINAL ENDOSCOPY           Review of Systems   All other systems reviewed and are negative  Objective:      /76 (BP Location: Left arm, Patient Position: Sitting, Cuff Size: Large)   Pulse 76   Temp 98 7 °F (37 1 °C) (Oral)   Wt 107 kg (235 lb)          Physical Exam   Constitutional: He is oriented to person, place, and time  He appears well-developed and well-nourished  No distress  HENT:   Head: Normocephalic and atraumatic  Eyes: Conjunctivae and EOM are normal    Neck: Normal range of motion  Cardiovascular: Normal rate and regular rhythm  Murmur heard  Pulmonary/Chest: Effort normal and breath sounds normal    Abdominal: Soft  Bowel sounds are normal  He exhibits no distension  There is no tenderness  Musculoskeletal: Normal range of motion  Neurological: He is alert and oriented to person, place, and time  Skin: Skin is warm and dry  Psychiatric: He has a normal mood and affect   His behavior is normal

## 2018-04-26 NOTE — PROGRESS NOTES
Assessment/Plan:     Diagnoses and all orders for this visit:    Duodenal ulcer/Acute blood loss anemia  Patient was admitted with melena and a decreased hemoglobin was found to have a duodenal ulcer  This was injected and clipped  He has been on pantoprazole b i d  since  Would recommend at this time to decrease to once a day  Since he requires baby aspirin would continue indefinitely at this point  He states his hemoglobin is stable and therefore would not recommend any further treatment at this time  -     pantoprazole (PROTONIX) 40 mg tablet; Take 1 tablet (40 mg total) by mouth daily    Will see him back on an as-needed basis  Subjective:      Patient ID: Lorena Bentley is a 68 y o  male  HPI     This is a hospital follow-up for GI bleeding  Patient was seen in February when he presented with melena  He was found to have a decrease in hemoglobin from 14-7  He denied NSAIDs but was taking a baby aspirin daily  He underwent EGD which showed a 10 mm duodenal ulcer that was bleeding  It was injected and clipped  He did well after an on discharge his hemoglobin was 8 8  He states he did have a repeat hemoglobin at his PCPs however he is unsure of what the number was  He is still taking his pantoprazole twice a day  He denies any symptoms of nausea, vomiting, heartburn, abdominal pain or melena  He had a previous episode of GI bleeding back in 2013  He underwent endoscopy, colonoscopy and eventually enteroscopy to identify the source      Patient Active Problem List   Diagnosis    Acute blood loss anemia    Duodenal ulcer     No Known Allergies  Current Outpatient Prescriptions on File Prior to Visit   Medication Sig    aspirin (ASPIRIN 81) 81 mg chewable tablet Chew 1 tablet (81 mg total) daily    brimonidine (ALPHAGAN P) 0 15 % ophthalmic solution Administer 1 drop to the right eye 2 (two) times a day    carvedilol (COREG) 25 mg tablet Take 25 mg by mouth 2 (two) times a day    nicotine (NICODERM CQ) 14 mg/24hr TD 24 hr patch Place 1 patch on the skin daily    rosuvastatin (CRESTOR) 20 MG tablet Take 20 mg by mouth daily      tamsulosin (FLOMAX) 0 4 mg Take 1 capsule by mouth see administration instructions Monday, Wednesday, Friday    valsartan (DIOVAN) 320 MG tablet Take 1 tablet by mouth daily after dinner      [DISCONTINUED] pantoprazole (PROTONIX) 40 mg tablet Take 1 tablet (40 mg total) by mouth 2 (two) times a day     No current facility-administered medications on file prior to visit  History reviewed  No pertinent family history  Past Medical History:   Diagnosis Date    GI bleed     Heart murmur     Hyperlipidemia      Social History     Social History    Marital status: /Civil Union     Spouse name: N/A    Number of children: N/A    Years of education: N/A     Social History Main Topics    Smoking status: Former Smoker    Smokeless tobacco: Current User    Alcohol use No      Comment: 1 drink of vodka daily , last drink 5 days ago     Drug use: No    Sexual activity: Not Asked     Other Topics Concern    None     Social History Narrative    None     Past Surgical History:   Procedure Laterality Date    CAROTID ARTERY ANGIOPLASTY      COLONOSCOPY      ESOPHAGOGASTRODUODENOSCOPY N/A 2/20/2018    Procedure: ESOPHAGOGASTRODUODENOSCOPY (EGD) with gold probe, hemoclip, 7ml epi injection  and bx;  Surgeon: Sujatha Ramirez MD;  Location: [facility]; Service: Gastroenterology    UPPER GASTROINTESTINAL ENDOSCOPY           Review of Systems   All other systems reviewed and are negative  Objective:      /76 (BP Location: Left arm, Patient Position: Sitting, Cuff Size: Large)   Pulse 76   Temp 98 7 °F (37 1 °C) (Oral)   Wt 107 kg (235 lb)          Physical Exam   Constitutional: He is oriented to person, place, and time  He appears well-developed and well-nourished  No distress  HENT:   Head: Normocephalic and atraumatic     Eyes: Conjunctivae and EOM are normal    Neck: Normal range of motion  Cardiovascular: Normal rate and regular rhythm  Murmur heard  Pulmonary/Chest: Effort normal and breath sounds normal    Abdominal: Soft  Bowel sounds are normal  He exhibits no distension  There is no tenderness  Musculoskeletal: Normal range of motion  Neurological: He is alert and oriented to person, place, and time  Skin: Skin is warm and dry  Psychiatric: He has a normal mood and affect   His behavior is normal

## 2018-07-17 NOTE — PROGRESS NOTES
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  PSAs remained stable  He will be maintained on Flomax  Return in 1 year with PSA prior to visit  All questions answered at this time  1  Prostate cancer ([facility] )  - POCT urine dip  - PSA Total, Diagnostic; Future    Assessment      Patient Active Problem List   Diagnosis    Acute blood loss anemia    Duodenal ulcer    [facility]       History of Present Illness    Maria C Padilla is a 68 y o  male seen today in regards to a history of prostate cancer treated with combination hormone deprivation therapy, and brachy therapy with Dr Ardelle Dandy in 2006  Patient states he has had no significant urinary complaints once the effects of radiation had subsided  Currently uses Flomax on a every other day basis  No interval gross hematuria urinary tract infection  PSAs have responded nicely, most recent is 0 12 as of February 2018  Urinary Symptom Assessment        Past Medical History  Past Medical History:   Diagnosis Date    GI bleed     Heart murmur     Hyperlipidemia     Hypertension     Prostate cancer [facility])        Past Social History  Past Surgical History:   Procedure Laterality Date    BACK SURGERY  2014    CAROTID ARTERY ANGIOPLASTY      COLONOSCOPY      ESOPHAGOGASTRODUODENOSCOPY N/A 2/20/2018    Procedure: ESOPHAGOGASTRODUODENOSCOPY (EGD) with gold probe, hemoclip, 7ml epi injection  and bx;  Surgeon: Jann Strange MD;  Location: [facility];   Service: Gastroenterology    UPPER GASTROINTESTINAL ENDOSCOPY         Past Family History  Family History   Problem Relation Age of Onset    Cancer Father         lung    Hypertension Mother        Past Social history  Social History     Social History    Marital status: /Civil Union     Spouse name: N/A    Number of children: N/A    Years of education: N/A     Occupational History    Not on file  Social History Main Topics    Smoking status: Former Smoker     Quit date: 2011    Smokeless tobacco: Never Used    Alcohol use Yes      Comment: 1 drink of vodka daily , last drink 5 days ago     Drug use: No    Sexual activity: Not on file     Other Topics Concern    Not on file     Social History Narrative    No narrative on file       Current Medications  Current Outpatient Prescriptions   Medication Sig Dispense Refill    aspirin (ASPIRIN 81) 81 mg chewable tablet Chew 1 tablet (81 mg total) daily  0    atorvastatin (LIPITOR) 40 mg tablet Take 40 mg by mouth      carvedilol (COREG) 25 mg tablet Take 25 mg by mouth 2 (two) times a day      latanoprost (XALATAN) 0 005 % ophthalmic solution INSTILL 1 DROP INTO RIGHT EYE EVERY DAY  5    pantoprazole (PROTONIX) 40 mg tablet Take 1 tablet (40 mg total) by mouth daily 30 tablet 5    tamsulosin (FLOMAX) 0 4 mg Take 1 capsule by mouth see administration instructions Monday, Wednesday, Friday, Saturday       valsartan (DIOVAN) 320 MG tablet Take 1 tablet by mouth daily after dinner        brimonidine (ALPHAGAN P) 0 15 % ophthalmic solution Administer 1 drop to the right eye 2 (two) times a day      nicotine (NICODERM CQ) 14 mg/24hr TD 24 hr patch Place 1 patch on the skin daily 28 patch 0    rosuvastatin (CRESTOR) 20 MG tablet Take 20 mg by mouth daily         No current facility-administered medications for this visit  Allergies  No Known Allergies    Past Medical History, Social History, Family History, medications and allergies were reviewed  Review of Systems  Review of Systems   Constitutional: Negative  HENT: Negative  Eyes: Negative  Respiratory: Negative  Cardiovascular: Negative  Gastrointestinal: Negative  Endocrine: Negative      Genitourinary: Negative for decreased urine volume, difficulty urinating, hematuria and urgency  Musculoskeletal: Negative  Skin: Negative  Neurological: Negative  Hematological: Negative  Psychiatric/Behavioral: Negative  Vitals  Vitals:    07/19/18 1102   BP: 140/98   Pulse: 71   Weight: 105 kg (230 lb 6 4 oz)   Height: 5' 9" (1 753 m)         Physical Exam    Physical Exam   Constitutional: He is oriented to person, place, and time  He appears well-developed and well-nourished  HENT:   Head: Normocephalic and atraumatic  Eyes: Pupils are equal, round, and reactive to light  Neck: Normal range of motion  Cardiovascular: Normal rate, regular rhythm and normal heart sounds  Pulmonary/Chest: Effort normal and breath sounds normal  No accessory muscle usage  No respiratory distress  Abdominal: Soft  Normal appearance and bowel sounds are normal  There is no tenderness  Genitourinary: Rectum normal, prostate normal and penis normal  No penile tenderness  Genitourinary Comments: Prostate less than 20 g and flat   Musculoskeletal: Normal range of motion  Neurological: He is alert and oriented to person, place, and time  Skin: Skin is warm, dry and intact  Psychiatric: He has a normal mood and affect  His speech is normal  Cognition and memory are normal    Nursing note and vitals reviewed        Results    Below listed labs, pathology results, and radiology images were personally reviewed:    No results found for: PSA  Lab Results   Component Value Date    GLUCOSE 117 02/22/2018    CALCIUM 8 6 02/22/2018     02/22/2018    K 3 7 02/22/2018    CO2 25 02/22/2018     02/22/2018    BUN 11 02/22/2018    CREATININE 0 77 02/22/2018     Lab Results   Component Value Date    WBC 5 96 02/23/2018    HGB 8 8 (L) 02/23/2018    HCT 26 5 (L) 02/23/2018    MCV 96 02/23/2018     02/23/2018       No results found for this or any previous visit (from the past 1 hour(s)) ]

## 2018-07-19 ENCOUNTER — OFFICE VISIT (OUTPATIENT)
Dept: UROLOGY | Facility: AMBULATORY SURGERY CENTER | Age: 78
End: 2018-07-19
Payer: MEDICARE

## 2018-07-19 VITALS
BODY MASS INDEX: 34.13 KG/M2 | HEIGHT: 69 IN | WEIGHT: 230.4 LBS | HEART RATE: 71 BPM | DIASTOLIC BLOOD PRESSURE: 98 MMHG | SYSTOLIC BLOOD PRESSURE: 140 MMHG

## 2018-07-19 DIAGNOSIS — C61 PROSTATE CANCER (HCC): Primary | ICD-10-CM

## 2018-07-19 DIAGNOSIS — C61 MALIGNANT TUMOR OF PROSTATE (HCC): ICD-10-CM

## 2018-07-19 LAB
SL AMB  POCT GLUCOSE, UA: NORMAL
SL AMB LEUKOCYTE ESTERASE,UA: NORMAL
SL AMB POCT BLOOD,UA: NORMAL
SL AMB POCT CLARITY,UA: CLEAR
SL AMB POCT COLOR,UA: YELLOW
SL AMB POCT KETONES,UA: NORMAL
SL AMB POCT NITRITE,UA: NORMAL
SL AMB POCT PH,UA: 7
SL AMB POCT SPECIFIC GRAVITY,UA: 1.01
SL AMB POCT URINE PROTEIN: NORMAL

## 2018-07-19 PROCEDURE — 99204 OFFICE O/P NEW MOD 45 MIN: CPT | Performed by: UROLOGY

## 2018-07-19 PROCEDURE — 81002 URINALYSIS NONAUTO W/O SCOPE: CPT | Performed by: UROLOGY

## 2018-07-19 RX ORDER — LATANOPROST 50 UG/ML
SOLUTION/ DROPS OPHTHALMIC
Refills: 5 | COMMUNITY
Start: 2018-05-24

## 2018-07-19 RX ORDER — ATORVASTATIN CALCIUM 40 MG/1
40 TABLET, FILM COATED ORAL
COMMUNITY
Start: 2018-07-02 | End: 2018-11-15 | Stop reason: ALTCHOICE

## 2018-10-12 DIAGNOSIS — K92.0 GASTROINTESTINAL HEMORRHAGE WITH HEMATEMESIS: ICD-10-CM

## 2018-10-12 RX ORDER — PANTOPRAZOLE SODIUM 40 MG/1
40 TABLET, DELAYED RELEASE ORAL DAILY
Qty: 30 TABLET | Refills: 5 | Status: SHIPPED | OUTPATIENT
Start: 2018-10-12 | End: 2018-10-19 | Stop reason: SDUPTHER

## 2018-10-19 RX ORDER — PANTOPRAZOLE SODIUM 40 MG/1
40 TABLET, DELAYED RELEASE ORAL DAILY
Qty: 90 TABLET | Refills: 3 | Status: SHIPPED | OUTPATIENT
Start: 2018-10-19 | End: 2019-09-25 | Stop reason: SDUPTHER

## 2018-11-15 ENCOUNTER — OFFICE VISIT (OUTPATIENT)
Dept: VASCULAR SURGERY | Facility: CLINIC | Age: 78
End: 2018-11-15
Payer: MEDICARE

## 2018-11-15 VITALS — TEMPERATURE: 96.7 F | BODY MASS INDEX: 34.02 KG/M2 | HEIGHT: 69 IN

## 2018-11-15 DIAGNOSIS — Z98.890 HISTORY OF RIGHT-SIDED CAROTID ENDARTERECTOMY: ICD-10-CM

## 2018-11-15 DIAGNOSIS — I10 ESSENTIAL HYPERTENSION: ICD-10-CM

## 2018-11-15 DIAGNOSIS — E78.2 MIXED HYPERLIPIDEMIA: ICD-10-CM

## 2018-11-15 DIAGNOSIS — I65.23 BILATERAL CAROTID ARTERY STENOSIS: Primary | ICD-10-CM

## 2018-11-15 PROCEDURE — 99204 OFFICE O/P NEW MOD 45 MIN: CPT | Performed by: NURSE PRACTITIONER

## 2018-11-15 NOTE — ASSESSMENT & PLAN NOTE
-Hx of R CEA in 2016 for tx of asymptomatic disease (OSH in [facility])  -Has relocated to area and would like to establish routine vascular care with our office  -Last CV duplex (April 2018) reviewed    Mild disease left carotid  -He will enter our routine surveillance monitoring program with annual CV duplex  -Continue aspirin and statin therapy

## 2018-11-15 NOTE — PROGRESS NOTES
Assessment/Plan:    67 y/o M with HTN, HLD, CAD, and carotid stenosis w/ hx of R CEA (2016) for tx of asymptomatic disease, seen to establish routine vascular care  Bilateral carotid artery stenosis  -Hx of R CEA in 2016 for tx of asymptomatic disease (OSH in [place])  -Has relocated to area and would like to establish routine vascular care with our office  -Last CV duplex (April 2018) reviewed  Mild disease left carotid  -He will enter our routine surveillance monitoring program with annual CV duplex  -Continue aspirin and statin therapy    Essential hypertension  -BP controlled on current medication regimen  -Management per primary team    Mixed hyperlipidemia  -Continue Crestor  -Management per primary team       Diagnoses and all orders for this visit:    Bilateral carotid artery stenosis  -     VAS carotid complete study; Future    History of right-sided carotid endarterectomy  -     VAS carotid complete study; Future    Mixed hyperlipidemia    Essential hypertension               Patient ID: Chrissy Wilson is a 66 y o  male  Chief complaint: Pt is new to our office here to discuss his carotid  Pt was being followed VS in [place]  Pt was referred by Dr Krystyna Soni [facility] vascular surgeon)  Pt had CEA done 03/03/2016  Pt has no recent testing  Pt is on asa and statin  Patient referred by his vascular surgeon in [place] patient referred by his vascular surgeon, Dr Krystyna Soni  Patient reports a history of right carotid endarterectomy in March 2016  He denies any history of stroke  He was following routinely with office in [place] post operatively  He has now relocated to the local area and would like to establish vascular care with our office  He denies any TIA/CVA symptoms, he denies any amaurosis fugax, lateralizing weakness or speech disturbances  He is on daily aspirin and Crestor therapy  He has a occasional cigar smoker  States that he quit smoking about 5 years ago  He denies any other complaints  Denies any lower extremity symptoms  The following portions of the patient's history were reviewed and updated as appropriate: allergies, current medications, past family history, past medical history, past social history, past surgical history and problem list     Review of Systems   Constitutional: Negative  HENT: Negative  Eyes: Negative  Respiratory: Negative  Cardiovascular: Negative  Gastrointestinal: Negative  Endocrine: Negative  Genitourinary: Negative  Musculoskeletal: Negative  Skin: Negative  Allergic/Immunologic: Negative  Neurological: Negative  Hematological: Negative  Psychiatric/Behavioral: Negative  Objective:      Temp (!) 96 7 °F (35 9 °C) (Tympanic)   Ht 5' 9" (1 753 m)   BMI 34 02 kg/m²          Physical Exam   Constitutional: He is oriented to person, place, and time  He appears well-nourished  HENT:   Head: Normocephalic and atraumatic  Eyes: EOM are normal  No scleral icterus  Neck: Neck supple  No JVD present  No murmurs appreciated   Cardiovascular: Normal rate and regular rhythm  Murmur (Holosystolic 3/6) heard  Pulses:       Radial pulses are 2+ on the right side, and 2+ on the left side  Pulmonary/Chest: Effort normal and breath sounds normal    Abdominal: Soft  He exhibits no distension  There is no tenderness  Musculoskeletal: Normal range of motion  He exhibits no edema  Neurological: He is alert and oriented to person, place, and time  Skin: Skin is warm and dry  Psychiatric: He has a normal mood and affect

## 2018-11-15 NOTE — PATIENT INSTRUCTIONS
Asymptomatic carotid stenosis    History of right carotid endarterectomy in 2016  -your carotid duplex from April 2018 looks good  -we will monitor your carotids with a duplex once per year in April  -continue all your current medications including aspirin and Crestor  -follow-up in 1 year/ April 2020 after second carotid duplex  -notify the office prior with any questions/concerns

## 2018-11-15 NOTE — LETTER
November 15, 2018     Ronal Hopper MD  [facility] [id number]    Patient: José Luis Ford   YOB: 1940   Date of Visit: 11/15/2018       Dear Dr Adams Class:    Thank you for referring José Luis Ford to me for evaluation  Below are my notes for this consultation  If you have questions, please do not hesitate to call me  I look forward to following your patient along with you           Sincerely,        PASTORA Werner        CC: No Recipients

## 2019-03-24 NOTE — NURSING NOTE
AVS reviewed with patient; patient verbalized understanding; IV removed
I have reviewed the previous RN'S assessment and agree with their findings 
left

## 2019-04-02 ENCOUNTER — HOSPITAL ENCOUNTER (OUTPATIENT)
Dept: NON INVASIVE DIAGNOSTICS | Facility: CLINIC | Age: 79
Discharge: HOME | End: 2019-04-02
Payer: MEDICARE

## 2019-04-02 DIAGNOSIS — I65.23 BILATERAL CAROTID ARTERY STENOSIS: ICD-10-CM

## 2019-04-02 DIAGNOSIS — Z98.890 HISTORY OF RIGHT-SIDED CAROTID ENDARTERECTOMY: ICD-10-CM

## 2019-04-02 PROCEDURE — 93880 EXTRACRANIAL BILAT STUDY: CPT

## 2019-04-02 PROCEDURE — 93880 EXTRACRANIAL BILAT STUDY: CPT | Performed by: SURGERY

## 2019-09-25 ENCOUNTER — OFFICE VISIT (OUTPATIENT)
Dept: UROLOGY | Facility: AMBULATORY SURGERY CENTER | Age: 79
End: 2019-09-25
Payer: MEDICARE

## 2019-09-25 VITALS
SYSTOLIC BLOOD PRESSURE: 148 MMHG | HEIGHT: 69 IN | HEART RATE: 73 BPM | BODY MASS INDEX: 31.1 KG/M2 | DIASTOLIC BLOOD PRESSURE: 92 MMHG | WEIGHT: 210 LBS

## 2019-09-25 DIAGNOSIS — C61 PROSTATE CANCER (HCC): Primary | ICD-10-CM

## 2019-09-25 PROCEDURE — 99213 OFFICE O/P EST LOW 20 MIN: CPT | Performed by: NURSE PRACTITIONER

## 2019-09-25 RX ORDER — LOSARTAN POTASSIUM 100 MG/1
100 TABLET ORAL DAILY
COMMUNITY
Start: 2019-01-07 | End: 2021-10-08

## 2019-09-25 RX ORDER — TADALAFIL 20 MG/1
TABLET ORAL DAILY
COMMUNITY
End: 2019-09-25

## 2019-09-25 RX ORDER — ROSUVASTATIN CALCIUM 20 MG/1
TABLET, COATED ORAL
COMMUNITY
End: 2019-09-25 | Stop reason: SDUPTHER

## 2019-09-25 RX ORDER — PANTOPRAZOLE SODIUM 40 MG/1
TABLET, DELAYED RELEASE ORAL
COMMUNITY
End: 2020-12-04

## 2019-09-25 RX ORDER — BRIMONIDINE TARTRATE 0.15 %
DROPS OPHTHALMIC (EYE)
COMMUNITY

## 2019-09-25 RX ORDER — TAMSULOSIN HYDROCHLORIDE 0.4 MG/1
CAPSULE ORAL
COMMUNITY
Start: 2017-04-27 | End: 2019-11-25 | Stop reason: SDUPTHER

## 2019-09-25 RX ORDER — ATORVASTATIN CALCIUM 40 MG/1
TABLET, FILM COATED ORAL
COMMUNITY
Start: 2019-06-21

## 2019-09-25 RX ORDER — VALSARTAN 320 MG/1
TABLET ORAL
Status: ON HOLD | COMMUNITY
End: 2021-10-14 | Stop reason: ALTCHOICE

## 2019-09-25 RX ORDER — AMLODIPINE BESYLATE 10 MG/1
TABLET ORAL
COMMUNITY

## 2019-09-25 RX ORDER — CARVEDILOL 25 MG/1
TABLET ORAL
COMMUNITY
Start: 2019-09-23 | End: 2020-12-04

## 2019-09-25 NOTE — PROGRESS NOTES
9/25/2019    Chief Complaint   Patient presents with    Prostate Cancer     Assessment and Plan    66 y o  male managed by Dr Meg Baker    1  Prostate cancer  · PSA performed 03/20/2019 is 0 21  · Repeat PSA in Feb 2020- ordered by PCP- patient wishes to continue with his PSA checks ordered by PCP  · Digital rectal exam reveals a prostate of approximately 20 g, flat, smooth, firm, no nodules noted  · Return to the office in 1 year for follow-up    History of Present Illness  Benjie Eason is a 66 y o  male here for follow up evaluation of  [place] 6 adenocarcinoma diagnosed in 2006  His most recent PSA is 0 21 which was performed on 03/20/2019  He has been treated with a combination of brachytherapy hormone deprivation therapy and prostate seeding  His PSA performed February 2018 was noted to be 0 12 patient is currently due for his repeat PSA to be performed in February 2020  He currently has no urinary complaints  He denies dysuria, hematuria, urinary frequency and urgency  He reports that he continues to use  tamsulosin 0 4 mg p o  Daily with no side effects from the medication  He denies changes to his overall health since his last office visit  Review of Systems   Constitutional: Negative for chills and fever  Respiratory: Negative for cough and shortness of breath  Cardiovascular: Negative for chest pain  Gastrointestinal: Negative for abdominal distention, abdominal pain, blood in stool, nausea and vomiting  Genitourinary: Negative for difficulty urinating, dysuria, enuresis, flank pain, frequency, hematuria and urgency  Musculoskeletal: Negative for back pain  Skin: Negative for rash  Neurological: Negative for dizziness and syncope       Past Medical History  Past Medical History:   Diagnosis Date    GI bleed     Heart murmur     Hyperlipidemia     Hypertension     Prostate cancer [facility])        Past Social History  Past Surgical History:   Procedure Laterality Date    BACK SURGERY     CAROTID ARTERY ANGIOPLASTY      COLONOSCOPY      ESOPHAGOGASTRODUODENOSCOPY N/A 2018    Procedure: ESOPHAGOGASTRODUODENOSCOPY (EGD) with gold probe, hemoclip, 7ml epi injection  and bx;  Surgeon: Sharath Wynne MD;  Location: [facility];   Service: Gastroenterology    UPPER GASTROINTESTINAL ENDOSCOPY       Social History     Tobacco Use   Smoking Status Former Smoker    Last attempt to quit:     Years since quittin 7   Smokeless Tobacco Never Used       Past Family History  Family History   Problem Relation Age of Onset    Cancer Father         lung    Hypertension Mother        Past Social history  Social History     Socioeconomic History    Marital status: /Civil Union     Spouse name: Not on file    Number of children: Not on file    Years of education: Not on file    Highest education level: Not on file   Occupational History    Not on file   Social Needs    Financial resource strain: Not on file    Food insecurity:     Worry: Not on file     Inability: Not on file    Transportation needs:     Medical: Not on file     Non-medical: Not on file   Tobacco Use    Smoking status: Former Smoker     Last attempt to quit:      Years since quittin 7    Smokeless tobacco: Never Used   Substance and Sexual Activity    Alcohol use: Yes     Comment: 1 drink of vodka daily , last drink 5 days ago     Drug use: No    Sexual activity: Not on file   Lifestyle    Physical activity:     Days per week: Not on file     Minutes per session: Not on file    Stress: Not on file   Relationships    Social connections:     Talks on phone: Not on file     Gets together: Not on file     Attends Nondenominational service: Not on file     Active member of club or organization: Not on file     Attends meetings of clubs or organizations: Not on file     Relationship status: Not on file    Intimate partner violence:     Fear of current or ex partner: Not on file     Emotionally abused: Not on file Physically abused: Not on file     Forced sexual activity: Not on file   Other Topics Concern    Not on file   Social History Narrative    Not on file       Current Medications  Current Outpatient Medications   Medication Sig Dispense Refill    amLODIPine (NORVASC) 10 mg tablet amlodipine 10 mg tablet      aspirin (ASPIRIN 81) 81 mg chewable tablet Chew 1 tablet (81 mg total) daily  0    atorvastatin (LIPITOR) 40 mg tablet TAKE 1 TABLET BY MOUTH EVERY DAY AT NIGHT      brimonidine (ALPHAGAN P) 0 15 % ophthalmic solution brimonidine 0 15 % eye drops      carvedilol (COREG) 25 mg tablet Take 25 mg by mouth 2 (two) times a day      carvedilol (COREG) 25 mg tablet carvedilol 25 mg tablet      latanoprost (XALATAN) 0 005 % ophthalmic solution INSTILL 1 DROP INTO RIGHT EYE EVERY DAY  5    losartan (COZAAR) 100 MG tablet Take 100 mg by mouth daily      pantoprazole (PROTONIX) 40 mg tablet pantoprazole 40 mg tablet,delayed release      rosuvastatin (CRESTOR) 20 MG tablet Take 20 mg by mouth daily        tamsulosin (FLOMAX) 0 4 mg tamsulosin 0 4 mg capsule      valsartan (DIOVAN) 320 MG tablet valsartan 320 mg tablet      Latanoprost 0 005 % EMUL latanoprost 0 005 % eye drops       No current facility-administered medications for this visit  Allergies  No Known Allergies      The following portions of the patient's history were reviewed and updated as appropriate: allergies, current medications, past medical history, past social history, past surgical history and problem list       Vitals  Vitals:    09/25/19 1452   BP: 148/92   BP Location: Left arm   Patient Position: Sitting   Cuff Size: Adult   Pulse: 73   Weight: 95 3 kg (210 lb)   Height: 5' 9" (1 753 m)     Physical Exam  Physical Exam   Constitutional: He is oriented to person, place, and time  He appears well-developed and well-nourished  HENT:   Head: Atraumatic  Eyes: EOM are normal    Neck: Neck supple     Pulmonary/Chest: Effort normal  No respiratory distress  Genitourinary: Prostate normal  Rectal exam shows no tenderness  Prostate is not tender  Genitourinary Comments: Prostate approximately 20 g, flat, smooth, nontender  No nodules noted   Musculoskeletal: Normal range of motion  Neurological: He is alert and oriented to person, place, and time  Skin: Skin is warm and dry  Psychiatric: He has a normal mood and affect  Vitals reviewed  Results  No results found for this or any previous visit (from the past 1 hour(s))  ]  No results found for: PSA  Lab Results   Component Value Date    CALCIUM 8 6 02/22/2018    K 3 7 02/22/2018    CO2 25 02/22/2018     02/22/2018    BUN 11 02/22/2018    CREATININE 0 77 02/22/2018     Lab Results   Component Value Date    WBC 5 96 02/23/2018    HGB 8 8 (L) 02/23/2018    HCT 26 5 (L) 02/23/2018    MCV 96 02/23/2018     02/23/2018     Orders  Orders Placed This Encounter   Procedures    PSA Total, Diagnostic     Standing Status:   Future     Standing Expiration Date:   9/25/2020       PASTORA Mckeon

## 2019-11-25 DIAGNOSIS — C61 MALIGNANT TUMOR OF PROSTATE (HCC): Primary | ICD-10-CM

## 2019-11-25 RX ORDER — TAMSULOSIN HYDROCHLORIDE 0.4 MG/1
0.4 CAPSULE ORAL
Qty: 90 CAPSULE | Refills: 3 | Status: SHIPPED | OUTPATIENT
Start: 2019-11-25 | End: 2021-03-31

## 2019-11-25 NOTE — TELEPHONE ENCOUNTER
The patient was last seen on 9/25/19 by PASTORA Brunson in the [facility] location; continuation of the medication was authorized at that time  Patient clarified that he only takes one capsules every Monday, Wednesday, Friday and Saturday but the script is still being written for a 90 day supply for maximum financial benefit  Patient aware of script status and verbalized understanding        Request for same, 90 day supply with 3 refills was queued and forwarded to the Advanced Practitioner covering the [facility] location for approval

## 2019-11-25 NOTE — TELEPHONE ENCOUNTER
Patient of Dr Plascencia Gave seen in the [place] office  Patient called to request a refill on tamsulosin 0 4 mg be sent to the [facility] on [facility] in [facility]

## 2019-11-29 DIAGNOSIS — K92.0 GASTROINTESTINAL HEMORRHAGE WITH HEMATEMESIS: ICD-10-CM

## 2019-11-29 RX ORDER — PANTOPRAZOLE SODIUM 40 MG/1
TABLET, DELAYED RELEASE ORAL
Qty: 90 TABLET | Refills: 3 | Status: SHIPPED | OUTPATIENT
Start: 2019-11-29 | End: 2020-11-05 | Stop reason: SDUPTHER

## 2020-09-25 ENCOUNTER — OFFICE VISIT (OUTPATIENT)
Dept: UROLOGY | Facility: CLINIC | Age: 80
End: 2020-09-25
Payer: MEDICARE

## 2020-09-25 VITALS
HEIGHT: 70 IN | HEART RATE: 62 BPM | DIASTOLIC BLOOD PRESSURE: 88 MMHG | SYSTOLIC BLOOD PRESSURE: 142 MMHG | WEIGHT: 208.6 LBS | BODY MASS INDEX: 29.86 KG/M2 | TEMPERATURE: 96.9 F

## 2020-09-25 DIAGNOSIS — C61 PROSTATE CANCER (HCC): Primary | ICD-10-CM

## 2020-09-25 PROCEDURE — 99213 OFFICE O/P EST LOW 20 MIN: CPT | Performed by: PHYSICIAN ASSISTANT

## 2020-09-25 NOTE — PROGRESS NOTES
9/25/2020      Chief Complaint   Patient presents with    Follow-up    Prostate Cancer         Assessment and Plan    78 y o  male previously managed by Dr Steve Vieyra    1  Prostate cancer    Continues to do very well  Prostate exam without significant findings today  PSA low and stable at 0 15 (9/2020)  He will continue Flomax every other day for his mild BPH symptoms  He will return yearly with PSA  History of Present Illness  Dillan Brewster is a 78 y o  male here for evaluation of annual visit prostate cancer  Diffuse [place] six adenocarcinoma diagnosed in 2006 treated with combination hormone deprivation therapy and brachytherapy by Dr Ana Vasquez at the time  He does currently use daily Flomax for some mild urinary symptoms for a few years  His PSA remains low and stable at 0 15 last week  Had porcine aortic valve replacement one year ago- on baby aspirin only now  He continues to walk and bike 4 miles a day  No hematuria, no UTIs, no retention, no flank pain, bone pain, weight loss, fevers or night sweats or chills  Review of Systems   Constitutional: Negative for activity change, appetite change, chills, fever and unexpected weight change  HENT: Negative  Respiratory: Negative  Negative for shortness of breath  Cardiovascular: Negative  Negative for chest pain  Gastrointestinal: Negative for abdominal pain, diarrhea, nausea and vomiting  Endocrine: Negative  Genitourinary: Negative for decreased urine volume, difficulty urinating, dysuria, flank pain, frequency, hematuria and urgency  Musculoskeletal: Negative for arthralgias, back pain and gait problem  Skin: Negative  Allergic/Immunologic: Negative  Neurological: Negative  Hematological: Negative for adenopathy  Does not bruise/bleed easily                  Past Medical History  Past Medical History:   Diagnosis Date    GI bleed     Heart murmur     Hyperlipidemia     Hypertension     Prostate cancer ([facility] [address] ) Past Social History  Past Surgical History:   Procedure Laterality Date    BACK SURGERY      CAROTID ARTERY ANGIOPLASTY      COLONOSCOPY      ESOPHAGOGASTRODUODENOSCOPY N/A 2018    Procedure: ESOPHAGOGASTRODUODENOSCOPY (EGD) with gold probe, hemoclip, 7ml epi injection  and bx;  Surgeon: Luis Miguel López MD;  Location: [facility];   Service: Gastroenterology    UPPER GASTROINTESTINAL ENDOSCOPY       Social History     Tobacco Use   Smoking Status Former Smoker    Last attempt to quit:     Years since quittin 7   Smokeless Tobacco Never Used     Past Family History  Family History   Problem Relation Age of Onset    Cancer Father         lung    Hypertension Mother      Past Social history  Social History     Socioeconomic History    Marital status: /Civil Union     Spouse name: Not on file    Number of children: Not on file    Years of education: Not on file    Highest education level: Not on file   Occupational History    Not on file   Social Needs    Financial resource strain: Not on file    Food insecurity     Worry: Not on file     Inability: Not on file    Transportation needs     Medical: Not on file     Non-medical: Not on file   Tobacco Use    Smoking status: Former Smoker     Last attempt to quit:      Years since quittin 7    Smokeless tobacco: Never Used   Substance and Sexual Activity    Alcohol use: Yes     Comment: 1 drink of vodka daily , last drink 5 days ago     Drug use: No    Sexual activity: Not on file   Lifestyle    Physical activity     Days per week: Not on file     Minutes per session: Not on file    Stress: Not on file   Relationships    Social connections     Talks on phone: Not on file     Gets together: Not on file     Attends Latter-day service: Not on file     Active member of club or organization: Not on file     Attends meetings of clubs or organizations: Not on file     Relationship status: Not on file    Intimate partner violence     Fear of current or ex partner: Not on file     Emotionally abused: Not on file     Physically abused: Not on file     Forced sexual activity: Not on file   Other Topics Concern    Not on file   Social History Narrative    Not on file     Current Medications  Current Outpatient Medications   Medication Sig Dispense Refill    amLODIPine (NORVASC) 10 mg tablet amlodipine 10 mg tablet      aspirin (ASPIRIN 81) 81 mg chewable tablet Chew 1 tablet (81 mg total) daily  0    atorvastatin (LIPITOR) 40 mg tablet TAKE 1 TABLET BY MOUTH EVERY DAY AT NIGHT      brimonidine (ALPHAGAN P) 0 15 % ophthalmic solution brimonidine 0 15 % eye drops      carvedilol (COREG) 25 mg tablet Take 25 mg by mouth 2 (two) times a day      carvedilol (COREG) 25 mg tablet carvedilol 25 mg tablet      latanoprost (XALATAN) 0 005 % ophthalmic solution INSTILL 1 DROP INTO RIGHT EYE EVERY DAY  5    Latanoprost 0 005 % EMUL latanoprost 0 005 % eye drops      losartan (COZAAR) 100 MG tablet Take 100 mg by mouth daily      pantoprazole (PROTONIX) 40 mg tablet pantoprazole 40 mg tablet,delayed release      pantoprazole (PROTONIX) 40 mg tablet TAKE 1 TABLET BY MOUTH EVERY DAY 90 tablet 3    rosuvastatin (CRESTOR) 20 MG tablet Take 20 mg by mouth daily        tamsulosin (FLOMAX) 0 4 mg Take 1 capsule (0 4 mg total) by mouth daily with dinner 90 capsule 3    valsartan (DIOVAN) 320 MG tablet valsartan 320 mg tablet       No current facility-administered medications for this visit        Allergies  No Known Allergies      The following portions of the patient's history were reviewed and updated as appropriate: allergies, current medications, past medical history, past social history, past surgical history and problem list       Vitals  Vitals:    09/25/20 0824   BP: 142/88   BP Location: Left arm   Patient Position: Sitting   Cuff Size: Adult   Pulse: 62   Temp: (!) 96 9 °F (36 1 °C)   Weight: 94 6 kg (208 lb 9 6 oz)   Height: 5' 10" (1 778 m)       Physical Exam  Vitals signs and nursing note reviewed  Constitutional:       General: He is not in acute distress  Appearance: Normal appearance  He is well-developed  He is not diaphoretic  HENT:      Head: Normocephalic and atraumatic  Pulmonary:      Effort: Pulmonary effort is normal       Comments: No cough or audible wheeze  Abdominal:      General: There is no distension  Tenderness: There is no abdominal tenderness  There is no right CVA tenderness or left CVA tenderness  Genitourinary:     Comments: Circumcised penis, normal phallus, orthotopic patent meatus  Testes smooth descended bilaterally to scrotum  Skin is normal   ADRIANA smooth firm small prostate about 20 g, no appreciable nodule  Musculoskeletal:      Right lower leg: No edema  Left lower leg: No edema  Skin:     General: Skin is warm and dry  Neurological:      Mental Status: He is alert and oriented to person, place, and time  Gait: Gait normal    Psychiatric:         Speech: Speech normal          Behavior: Behavior normal            Results  No results found for this or any previous visit (from the past 1 hour(s))  ]  No results found for: PSA  Lab Results   Component Value Date    CALCIUM 8 6 02/22/2018    K 3 7 02/22/2018    CO2 25 02/22/2018     02/22/2018    BUN 11 02/22/2018    CREATININE 0 77 02/22/2018     Lab Results   Component Value Date    WBC 5 96 02/23/2018    HGB 8 8 (L) 02/23/2018    HCT 26 5 (L) 02/23/2018    MCV 96 02/23/2018     02/23/2018         Orders  Orders Placed This Encounter   Procedures    PSA Total, Diagnostic     Standing Status:   Future     Standing Expiration Date:   3/25/2022

## 2020-11-05 DIAGNOSIS — K92.0 GASTROINTESTINAL HEMORRHAGE WITH HEMATEMESIS: ICD-10-CM

## 2020-11-05 RX ORDER — PANTOPRAZOLE SODIUM 40 MG/1
40 TABLET, DELAYED RELEASE ORAL DAILY
Qty: 90 TABLET | Refills: 0 | Status: SHIPPED | OUTPATIENT
Start: 2020-11-05 | End: 2020-12-04 | Stop reason: SDUPTHER

## 2020-12-04 ENCOUNTER — OFFICE VISIT (OUTPATIENT)
Dept: GASTROENTEROLOGY | Facility: MEDICAL CENTER | Age: 80
End: 2020-12-04
Payer: MEDICARE

## 2020-12-04 VITALS
DIASTOLIC BLOOD PRESSURE: 78 MMHG | SYSTOLIC BLOOD PRESSURE: 132 MMHG | HEART RATE: 77 BPM | WEIGHT: 209.8 LBS | BODY MASS INDEX: 30.1 KG/M2 | TEMPERATURE: 98.1 F

## 2020-12-04 DIAGNOSIS — Z87.19 HISTORY OF DUODENAL ULCER: ICD-10-CM

## 2020-12-04 DIAGNOSIS — D69.6 THROMBOCYTOPENIA (HCC): ICD-10-CM

## 2020-12-04 DIAGNOSIS — Z87.19 H/O: UPPER GI BLEED: Primary | ICD-10-CM

## 2020-12-04 PROCEDURE — 99213 OFFICE O/P EST LOW 20 MIN: CPT | Performed by: INTERNAL MEDICINE

## 2020-12-04 RX ORDER — PANTOPRAZOLE SODIUM 40 MG/1
40 TABLET, DELAYED RELEASE ORAL DAILY
Qty: 90 TABLET | Refills: 3 | Status: SHIPPED | OUTPATIENT
Start: 2020-12-04 | End: 2022-01-19 | Stop reason: SDUPTHER

## 2021-01-11 ENCOUNTER — HOSPITAL ENCOUNTER (OUTPATIENT)
Dept: ULTRASOUND IMAGING | Facility: HOSPITAL | Age: 81
Discharge: HOME | End: 2021-01-11
Attending: INTERNAL MEDICINE
Payer: MEDICARE

## 2021-01-11 DIAGNOSIS — D69.6 THROMBOCYTOPENIA (HCC): ICD-10-CM

## 2021-01-11 PROCEDURE — 76705 ECHO EXAM OF ABDOMEN: CPT

## 2021-01-13 DIAGNOSIS — D69.6 THROMBOCYTOPENIA (HCC): Primary | ICD-10-CM

## 2021-01-14 ENCOUNTER — TELEPHONE (OUTPATIENT)
Dept: HEMATOLOGY ONCOLOGY | Facility: CLINIC | Age: 81
End: 2021-01-14

## 2021-01-14 NOTE — TELEPHONE ENCOUNTER
New Patient Encounter    New Patient Intake Form   Patient Details:  Houston Torres  1940  [id number]    Background Information:  [address] starts by opening a telephone encounter and gathering the following information   Who is calling to schedule? If not self, relationship to patient? self   Referring Provider Cleo Hollis   What is the diagnosis? thrombocytopenia   Is this diagnosis confirmed? Yes   When was the diagnosis? 1/2021   Is there a confirmed diagnosis from a biopsy/tissue reviewed by pathology? Were outside slides requested? Is patient aware of diagnosis? Yes   Is there a personal history and what kind? Is there a family history and what kind? Reason for visit? New Diagnosis   Have you had any imaging or labs done? If so: when, where? yes  [facility]   Are records in [place]? yes   If patient has a prior history of breast cancer were old records obtained? Was the patient told to bring a disk? Does the patient smoke or Vape? no   If yes, how many packs or cartridges per day? occational cigar   Scheduling Information:   Preferred [place]:  [facility]     Are there any dates/time the patient cannot be seen? Miscellaneous:    After completing the above information, please route to Financial Counselor and the appropriate Nurse Navigator for review

## 2021-01-14 NOTE — RESULT ENCOUNTER NOTE
My medical assistant will call patient with results  Please let him know that his liver appears normal on ultrasound  The ultrasound only showed small benign kidney cyst   Given no cause on his ultrasound for his low platelets I have placed order for Hematology evaluation  He should schedule with them to discuss any additional testing

## 2021-01-20 DIAGNOSIS — Z23 ENCOUNTER FOR IMMUNIZATION: ICD-10-CM

## 2021-01-22 PROBLEM — D69.6 THROMBOCYTOPENIA (HCC): Status: ACTIVE | Noted: 2021-01-22

## 2021-01-22 NOTE — PROGRESS NOTES
[facility] - Hematology & Medical Oncology  Outpatient Visit Encounter Note      Marquise Justin [de-identified] y o  male [id number] Date:  1/25/2021      Quinten Arellano is a 27-year-old male coming to see me for thrombocytopenia  He is referred by his gastroenterologist   He has a history of localized prostate adenocarcinoma in 2006 under urological care  His medical records are mix of [address] [place] [address]  From our hospital system, his last blood work was from February 2018 when his platelet counts were normal   Per the notes from his gastroenterologist, he had platelets of 467[address] in spring of 2020  There were being followed and because of a decline in the count, workup is being performed  His most recent platelet count from January 6, 2021 is 105,000  His hemoglobin and white blood cell count are normal     His gastroenterologist ordered ultrasound of the abdomen that did not show any splenomegaly or hepatomegaly  He is here by himself  He offers no acute complaints  He denies bleeding or clotting history  He denies f/c/n/v/c/cp/ap/sob  He has not started any new medications  I have reviewed the relevant past medical, surgical, social and family history  I have also reviewed allergies and medications for this patient  Review of Systems  ROS      OBJECTIVE     Physical Exam  Vitals:    01/25/21 0854   BP: 160/80   BP Location: Left arm   Patient Position: Sitting   Cuff Size: Adult   Pulse: 76   Resp: 18   Temp: 97 9 °F (36 6 °C)   TempSrc: Tympanic   SpO2: 99%   Weight: 96 6 kg (213 lb)   Height: 5' 10" (1 778 m)       Physical Exam  Vitals signs reviewed  Constitutional:       General: He is not in acute distress  Appearance: He is not ill-appearing, toxic-appearing or diaphoretic  HENT:      Head: Normocephalic and atraumatic  Eyes:      Extraocular Movements: Extraocular movements intact     Neck:      Musculoskeletal: Normal range of motion and neck supple  Cardiovascular:      Rate and Rhythm: Normal rate  Pulmonary:      Effort: Pulmonary effort is normal  No respiratory distress  Abdominal:      General: Bowel sounds are normal  There is no distension  Palpations: Abdomen is soft  Tenderness: There is no abdominal tenderness  There is no guarding  Musculoskeletal: Normal range of motion  General: No swelling or tenderness  Right lower leg: No edema  Left lower leg: No edema  Skin:     General: Skin is dry  Neurological:      General: No focal deficit present  Mental Status: He is alert and oriented to person, place, and time  Imaging  Relevant imaging reviewed in chart    Labs  Relevant labs reviewed in chart   ASSESSMENT & PLAN      Diagnosis ICD-10-CM Associated Orders   1  [facility]  D69 6 Ambulatory referral to Interventional Radiology     HIV 1/2 Antigen/Antibody (4th Generation) w Reflex [facility]     Hepatitis panel, acute     Folate     Vitamin B12     Ambulatory referral to Hematology / Oncology   2  Malignant tumor of prostate ([facility] )  C61    3  Encounter for other specified special examinations   Z01 89 Hepatitis panel, acute       Thrombocytopenia  · In review of his chart, there is no clear explanation for his decreased platelet count  They are in the moderate range at the moment  While it is reassuring that his hematocrit and white blood cell count are within normal limits, given his age, a low platelet count of this degree the indication for a bone marrow biopsy  I have ordered an interventional radiology bone marrow biopsy procedure  I have explained the indication and the use of this procedure to the patient        Localized prostate adenocarcinoma  · Follows with Dr Lindsey Rubi and managed by him  · Documented [place] 6 score with diagnosis in 2006 and is currently status post combination hormone deprivation therapy and brachytherapy    Follow Up   1 month      All questions were answered to the patient's satisfaction during this encounter  They appreciated and thanked me for spending time with them  The patient knows the contact information for our office and know to reach out for any relevant concerns related to this encounter  For all other listed problems and medical diagnosis in his chart - they are managed by PCP and/or other specialists, which patient acknowledges  Dr May Johnson MD  Hematology & Medical Oncology

## 2021-01-25 ENCOUNTER — CONSULT (OUTPATIENT)
Dept: HEMATOLOGY ONCOLOGY | Facility: CLINIC | Age: 81
End: 2021-01-25
Payer: MEDICARE

## 2021-01-25 VITALS
HEART RATE: 76 BPM | OXYGEN SATURATION: 99 % | SYSTOLIC BLOOD PRESSURE: 160 MMHG | HEIGHT: 70 IN | RESPIRATION RATE: 18 BRPM | WEIGHT: 213 LBS | TEMPERATURE: 97.9 F | BODY MASS INDEX: 30.49 KG/M2 | DIASTOLIC BLOOD PRESSURE: 80 MMHG

## 2021-01-25 DIAGNOSIS — D69.6 THROMBOCYTOPENIA (HCC): Primary | ICD-10-CM

## 2021-01-25 DIAGNOSIS — C61 MALIGNANT TUMOR OF PROSTATE (HCC): ICD-10-CM

## 2021-01-25 DIAGNOSIS — Z01.89 ENCOUNTER FOR OTHER SPECIFIED SPECIAL EXAMINATIONS: ICD-10-CM

## 2021-01-25 LAB
EXTERNAL HIV SCREEN: NORMAL
HCV AB SER-ACNC: NEGATIVE

## 2021-01-25 PROCEDURE — 99205 OFFICE O/P NEW HI 60 MIN: CPT | Performed by: INTERNAL MEDICINE

## 2021-02-09 ENCOUNTER — TELEPHONE (OUTPATIENT)
Dept: SURGICAL ONCOLOGY | Facility: CLINIC | Age: 81
End: 2021-02-09

## 2021-02-09 NOTE — TELEPHONE ENCOUNTER
Pt called to cancel his f/u  He also cancelled the bone marrow bx - he does not want to do this  He states he will follow with his pcp who will monitor his labs

## 2021-03-30 DIAGNOSIS — C61 MALIGNANT TUMOR OF PROSTATE (HCC): ICD-10-CM

## 2021-03-31 RX ORDER — TAMSULOSIN HYDROCHLORIDE 0.4 MG/1
CAPSULE ORAL
Qty: 90 CAPSULE | Refills: 3 | Status: SHIPPED | OUTPATIENT
Start: 2021-03-31 | End: 2022-03-27

## 2021-09-27 ENCOUNTER — APPOINTMENT (OUTPATIENT)
Dept: LAB | Facility: CLINIC | Age: 81
End: 2021-09-27
Payer: MEDICARE

## 2021-09-27 DIAGNOSIS — C61 PROSTATE CANCER (HCC): ICD-10-CM

## 2021-09-27 LAB — PSA SERPL-MCNC: 0.2 NG/ML (ref 0–4)

## 2021-09-27 PROCEDURE — 84153 ASSAY OF PSA TOTAL: CPT

## 2021-09-30 ENCOUNTER — CLINICAL SUPPORT (OUTPATIENT)
Dept: URGENT CARE | Facility: MEDICAL CENTER | Age: 81
End: 2021-09-30
Payer: MEDICARE

## 2021-09-30 DIAGNOSIS — D48.5 NEOPLASM OF UNCERTAIN BEHAVIOR OF SKIN: ICD-10-CM

## 2021-09-30 DIAGNOSIS — Z01.810 ENCOUNTER FOR PREPROCEDURAL CARDIOVASCULAR EXAMINATION: ICD-10-CM

## 2021-09-30 LAB
ATRIAL RATE: 340 BPM
P AXIS: 44 DEGREES
QRS AXIS: -38 DEGREES
QRSD INTERVAL: 88 MS
QT INTERVAL: 402 MS
QTC INTERVAL: 424 MS
T WAVE AXIS: 2 DEGREES
VENTRICULAR RATE: 67 BPM

## 2021-09-30 PROCEDURE — 93010 ELECTROCARDIOGRAM REPORT: CPT | Performed by: INTERNAL MEDICINE

## 2021-09-30 PROCEDURE — 93005 ELECTROCARDIOGRAM TRACING: CPT

## 2021-10-01 ENCOUNTER — OFFICE VISIT (OUTPATIENT)
Dept: UROLOGY | Facility: CLINIC | Age: 81
End: 2021-10-01
Payer: MEDICARE

## 2021-10-01 VITALS
WEIGHT: 212 LBS | DIASTOLIC BLOOD PRESSURE: 78 MMHG | HEART RATE: 68 BPM | HEIGHT: 70 IN | BODY MASS INDEX: 30.35 KG/M2 | SYSTOLIC BLOOD PRESSURE: 122 MMHG

## 2021-10-01 DIAGNOSIS — C61 MALIGNANT TUMOR OF PROSTATE (HCC): Primary | ICD-10-CM

## 2021-10-01 LAB
ATRIAL RATE: 68 BPM
P AXIS: 26 DEGREES
PR INTERVAL: 176 MS
QRS AXIS: -38 DEGREES
QRSD INTERVAL: 96 MS
QT INTERVAL: 404 MS
QTC INTERVAL: 429 MS
T WAVE AXIS: 4 DEGREES
VENTRICULAR RATE: 68 BPM

## 2021-10-01 PROCEDURE — 99214 OFFICE O/P EST MOD 30 MIN: CPT | Performed by: PHYSICIAN ASSISTANT

## 2021-10-01 PROCEDURE — 93010 ELECTROCARDIOGRAM REPORT: CPT | Performed by: INTERNAL MEDICINE

## 2021-10-13 ENCOUNTER — ANESTHESIA EVENT (OUTPATIENT)
Dept: PERIOP | Facility: HOSPITAL | Age: 81
End: 2021-10-13
Payer: MEDICARE

## 2021-10-14 ENCOUNTER — HOSPITAL ENCOUNTER (OUTPATIENT)
Facility: HOSPITAL | Age: 81
Setting detail: OUTPATIENT SURGERY
Discharge: HOME | End: 2021-10-14
Attending: STUDENT IN AN ORGANIZED HEALTH CARE EDUCATION/TRAINING PROGRAM | Admitting: STUDENT IN AN ORGANIZED HEALTH CARE EDUCATION/TRAINING PROGRAM
Payer: MEDICARE

## 2021-10-14 ENCOUNTER — ANESTHESIA (OUTPATIENT)
Dept: PERIOP | Facility: HOSPITAL | Age: 81
End: 2021-10-14
Payer: MEDICARE

## 2021-10-14 VITALS
SYSTOLIC BLOOD PRESSURE: 123 MMHG | OXYGEN SATURATION: 98 % | TEMPERATURE: 97.6 F | BODY MASS INDEX: 30.35 KG/M2 | DIASTOLIC BLOOD PRESSURE: 69 MMHG | HEART RATE: 57 BPM | RESPIRATION RATE: 16 BRPM | HEIGHT: 70 IN | WEIGHT: 212 LBS

## 2021-10-14 DIAGNOSIS — C44.329 SQUAMOUS CELL CARCINOMA OF SKIN OF OTHER PARTS OF FACE: ICD-10-CM

## 2021-10-14 PROCEDURE — 88331 PATH CONSLTJ SURG 1 BLK 1SPC: CPT | Performed by: PATHOLOGY

## 2021-10-14 PROCEDURE — 88331 PATH CONSLTJ SURG 1 BLK 1SPC: CPT | Performed by: STUDENT IN AN ORGANIZED HEALTH CARE EDUCATION/TRAINING PROGRAM

## 2021-10-14 PROCEDURE — 88305 TISSUE EXAM BY PATHOLOGIST: CPT | Performed by: PATHOLOGY

## 2021-10-14 PROCEDURE — 88332 PATH CONSLTJ SURG EA ADD BLK: CPT | Performed by: PATHOLOGY

## 2021-10-14 PROCEDURE — 88332 PATH CONSLTJ SURG EA ADD BLK: CPT | Performed by: STUDENT IN AN ORGANIZED HEALTH CARE EDUCATION/TRAINING PROGRAM

## 2021-10-14 RX ORDER — CEFAZOLIN SODIUM 2 G/50ML
SOLUTION INTRAVENOUS AS NEEDED
Status: DISCONTINUED | OUTPATIENT
Start: 2021-10-14 | End: 2021-10-14

## 2021-10-14 RX ORDER — ACETAMINOPHEN 325 MG/1
650 TABLET ORAL EVERY 6 HOURS PRN
Status: DISCONTINUED | OUTPATIENT
Start: 2021-10-14 | End: 2021-10-14 | Stop reason: HOSPADM

## 2021-10-14 RX ORDER — FENTANYL CITRATE/PF 50 MCG/ML
12.5 SYRINGE (ML) INJECTION
Status: DISCONTINUED | OUTPATIENT
Start: 2021-10-14 | End: 2021-10-14 | Stop reason: HOSPADM

## 2021-10-14 RX ORDER — PROMETHAZINE HYDROCHLORIDE 25 MG/ML
12.5 INJECTION, SOLUTION INTRAMUSCULAR; INTRAVENOUS ONCE AS NEEDED
Status: DISCONTINUED | OUTPATIENT
Start: 2021-10-14 | End: 2021-10-14 | Stop reason: HOSPADM

## 2021-10-14 RX ORDER — GINSENG 100 MG
CAPSULE ORAL AS NEEDED
Status: DISCONTINUED | OUTPATIENT
Start: 2021-10-14 | End: 2021-10-14 | Stop reason: HOSPADM

## 2021-10-14 RX ORDER — FENTANYL CITRATE/PF 50 MCG/ML
25 SYRINGE (ML) INJECTION
Status: DISCONTINUED | OUTPATIENT
Start: 2021-10-14 | End: 2021-10-14 | Stop reason: HOSPADM

## 2021-10-14 RX ORDER — MAGNESIUM HYDROXIDE 1200 MG/15ML
LIQUID ORAL AS NEEDED
Status: DISCONTINUED | OUTPATIENT
Start: 2021-10-14 | End: 2021-10-14 | Stop reason: HOSPADM

## 2021-10-14 RX ORDER — FENTANYL CITRATE 50 UG/ML
INJECTION, SOLUTION INTRAMUSCULAR; INTRAVENOUS AS NEEDED
Status: DISCONTINUED | OUTPATIENT
Start: 2021-10-14 | End: 2021-10-14

## 2021-10-14 RX ORDER — CEFAZOLIN SODIUM 2 G/50ML
2000 SOLUTION INTRAVENOUS ONCE
Status: DISCONTINUED | OUTPATIENT
Start: 2021-10-14 | End: 2021-10-14 | Stop reason: HOSPADM

## 2021-10-14 RX ORDER — LIDOCAINE HYDROCHLORIDE AND EPINEPHRINE 10; 10 MG/ML; UG/ML
INJECTION, SOLUTION INFILTRATION; PERINEURAL AS NEEDED
Status: DISCONTINUED | OUTPATIENT
Start: 2021-10-14 | End: 2021-10-14 | Stop reason: HOSPADM

## 2021-10-14 RX ORDER — SODIUM CHLORIDE, SODIUM LACTATE, POTASSIUM CHLORIDE, CALCIUM CHLORIDE 600; 310; 30; 20 MG/100ML; MG/100ML; MG/100ML; MG/100ML
75 INJECTION, SOLUTION INTRAVENOUS CONTINUOUS
Status: DISCONTINUED | OUTPATIENT
Start: 2021-10-14 | End: 2021-10-14 | Stop reason: HOSPADM

## 2021-10-14 RX ORDER — EPHEDRINE SULFATE 50 MG/ML
INJECTION INTRAVENOUS AS NEEDED
Status: DISCONTINUED | OUTPATIENT
Start: 2021-10-14 | End: 2021-10-14

## 2021-10-14 RX ORDER — LIDOCAINE HYDROCHLORIDE 10 MG/ML
INJECTION, SOLUTION EPIDURAL; INFILTRATION; INTRACAUDAL; PERINEURAL AS NEEDED
Status: DISCONTINUED | OUTPATIENT
Start: 2021-10-14 | End: 2021-10-14

## 2021-10-14 RX ORDER — DIPHENHYDRAMINE HYDROCHLORIDE 50 MG/ML
12.5 INJECTION INTRAMUSCULAR; INTRAVENOUS ONCE AS NEEDED
Status: DISCONTINUED | OUTPATIENT
Start: 2021-10-14 | End: 2021-10-14 | Stop reason: HOSPADM

## 2021-10-14 RX ORDER — DEXAMETHASONE SODIUM PHOSPHATE 4 MG/ML
4 INJECTION, SOLUTION INTRA-ARTICULAR; INTRALESIONAL; INTRAMUSCULAR; INTRAVENOUS; SOFT TISSUE ONCE AS NEEDED
Status: DISCONTINUED | OUTPATIENT
Start: 2021-10-14 | End: 2021-10-14 | Stop reason: HOSPADM

## 2021-10-14 RX ORDER — MEPERIDINE HYDROCHLORIDE 25 MG/ML
12.5 INJECTION INTRAMUSCULAR; INTRAVENOUS; SUBCUTANEOUS
Status: DISCONTINUED | OUTPATIENT
Start: 2021-10-14 | End: 2021-10-14 | Stop reason: HOSPADM

## 2021-10-14 RX ORDER — PROPOFOL 10 MG/ML
INJECTION, EMULSION INTRAVENOUS AS NEEDED
Status: DISCONTINUED | OUTPATIENT
Start: 2021-10-14 | End: 2021-10-14

## 2021-10-14 RX ORDER — PROPOFOL 10 MG/ML
INJECTION, EMULSION INTRAVENOUS CONTINUOUS PRN
Status: DISCONTINUED | OUTPATIENT
Start: 2021-10-14 | End: 2021-10-14

## 2021-10-14 RX ADMIN — LIDOCAINE HYDROCHLORIDE 30 MG: 10 INJECTION, SOLUTION EPIDURAL; INFILTRATION; INTRACAUDAL; PERINEURAL at 07:23

## 2021-10-14 RX ADMIN — SODIUM CHLORIDE, SODIUM LACTATE, POTASSIUM CHLORIDE, AND CALCIUM CHLORIDE 75 ML/HR: .6; .31; .03; .02 INJECTION, SOLUTION INTRAVENOUS at 05:58

## 2021-10-14 RX ADMIN — FENTANYL CITRATE 50 MCG: 50 INJECTION, SOLUTION INTRAMUSCULAR; INTRAVENOUS at 07:23

## 2021-10-14 RX ADMIN — EPHEDRINE SULFATE 5 MG: 50 INJECTION, SOLUTION INTRAVENOUS at 07:46

## 2021-10-14 RX ADMIN — CEFAZOLIN SODIUM 2000 MG: 2 SOLUTION INTRAVENOUS at 07:16

## 2021-10-14 RX ADMIN — PROPOFOL 50 MG: 10 INJECTION, EMULSION INTRAVENOUS at 07:23

## 2021-10-14 RX ADMIN — FENTANYL CITRATE 25 MCG: 50 INJECTION, SOLUTION INTRAMUSCULAR; INTRAVENOUS at 07:38

## 2021-10-14 RX ADMIN — PROPOFOL 80 MCG/KG/MIN: 10 INJECTION, EMULSION INTRAVENOUS at 07:23

## 2021-10-26 ENCOUNTER — TELEPHONE (OUTPATIENT)
Dept: VASCULAR SURGERY | Facility: CLINIC | Age: 81
End: 2021-10-26

## 2021-10-26 ENCOUNTER — TELEPHONE (OUTPATIENT)
Dept: ADMINISTRATIVE | Facility: HOSPITAL | Age: 81
End: 2021-10-26

## 2021-10-26 DIAGNOSIS — I65.23 BILATERAL CAROTID ARTERY STENOSIS: Primary | ICD-10-CM

## 2021-11-26 ENCOUNTER — HOSPITAL ENCOUNTER (EMERGENCY)
Facility: HOSPITAL | Age: 81
Discharge: HOME | End: 2021-11-26
Attending: EMERGENCY MEDICINE
Payer: MEDICARE

## 2021-11-26 VITALS
HEIGHT: 70 IN | RESPIRATION RATE: 18 BRPM | HEART RATE: 83 BPM | BODY MASS INDEX: 31.4 KG/M2 | TEMPERATURE: 97.3 F | OXYGEN SATURATION: 97 % | SYSTOLIC BLOOD PRESSURE: 125 MMHG | DIASTOLIC BLOOD PRESSURE: 66 MMHG | WEIGHT: 219.36 LBS

## 2021-11-26 DIAGNOSIS — S01.01XA LACERATION OF SCALP, INITIAL ENCOUNTER: Primary | ICD-10-CM

## 2021-11-26 PROCEDURE — 99284 EMERGENCY DEPT VISIT MOD MDM: CPT | Performed by: EMERGENCY MEDICINE

## 2021-11-26 PROCEDURE — 99283 EMERGENCY DEPT VISIT LOW MDM: CPT

## 2021-11-26 PROCEDURE — 90471 IMMUNIZATION ADMIN: CPT

## 2021-11-26 PROCEDURE — 12002 RPR S/N/AX/GEN/TRNK2.6-7.5CM: CPT | Performed by: EMERGENCY MEDICINE

## 2021-11-26 PROCEDURE — 90715 TDAP VACCINE 7 YRS/> IM: CPT | Performed by: EMERGENCY MEDICINE

## 2021-11-26 RX ADMIN — TETANUS TOXOID, REDUCED DIPHTHERIA TOXOID AND ACELLULAR PERTUSSIS VACCINE, ADSORBED 0.5 ML: 5; 2.5; 8; 8; 2.5 SUSPENSION INTRAMUSCULAR at 23:46

## 2022-01-19 ENCOUNTER — TELEPHONE (OUTPATIENT)
Dept: GASTROENTEROLOGY | Facility: MEDICAL CENTER | Age: 82
End: 2022-01-19

## 2022-01-19 DIAGNOSIS — Z87.19 H/O: UPPER GI BLEED: ICD-10-CM

## 2022-01-19 DIAGNOSIS — Z87.19 HISTORY OF DUODENAL ULCER: ICD-10-CM

## 2022-01-19 RX ORDER — PANTOPRAZOLE SODIUM 40 MG/1
TABLET, DELAYED RELEASE ORAL
Qty: 90 TABLET | Refills: 3 | OUTPATIENT
Start: 2022-01-19

## 2022-01-19 RX ORDER — PANTOPRAZOLE SODIUM 40 MG/1
40 TABLET, DELAYED RELEASE ORAL DAILY
Qty: 90 TABLET | Refills: 0 | Status: SHIPPED | OUTPATIENT
Start: 2022-01-19 | End: 2022-04-16

## 2022-01-19 NOTE — TELEPHONE ENCOUNTER
Patient last seen 12/4/20, he will need to be seen in order to keep receiving refills from us    Clerical: please reach out to patient to set up appt    Provider: 90 day supply attached, no refills

## 2022-01-25 ENCOUNTER — OFFICE VISIT (OUTPATIENT)
Dept: GASTROENTEROLOGY | Facility: MEDICAL CENTER | Age: 82
End: 2022-01-25
Payer: MEDICARE

## 2022-01-25 VITALS
HEART RATE: 67 BPM | SYSTOLIC BLOOD PRESSURE: 132 MMHG | BODY MASS INDEX: 30.85 KG/M2 | WEIGHT: 215 LBS | DIASTOLIC BLOOD PRESSURE: 76 MMHG

## 2022-01-25 DIAGNOSIS — K21.9 GASTROESOPHAGEAL REFLUX DISEASE WITHOUT ESOPHAGITIS: Primary | ICD-10-CM

## 2022-01-25 PROCEDURE — 99213 OFFICE O/P EST LOW 20 MIN: CPT | Performed by: PHYSICIAN ASSISTANT

## 2022-01-25 NOTE — PROGRESS NOTES
Assessment/Plan:     Diagnoses and all orders for this visit:    Gastroesophageal reflux disease without esophagitis  Patient was previously seen for a GI bleed back in 2018  He was placed on PPI therapy b i d   This is been decreased to once daily  He states while on medication he has no symptoms  No indication for repeat endoscopy  Would recommend continuing medication  Will continue to see him on a yearly basis or sooner if necessary  Subjective:      Patient ID: Demetria Merchant is a 80 y o  male  HPI     Patient is following up for GERD  He was seen back in February 2018 with melena and underwent endoscopy which showed a bleeding vessel in the duodenal sweep, injected and cauterized and clipped  He was placed on PPI b i d  which was then decreased to daily  He has then been seen yearly for medication refills  He states while on the medication he has no heartburn or reflux symptoms  He denies any abdominal pain  He denies any difficulty with his bowels  He states he had a colonoscopy many years ago      Patient Active Problem List   Diagnosis    Acute blood loss anemia    Duodenal ulcer    [facility]ignant [facility] ([facility]    Bilateral carotid artery stenosis    History of right-sided carotid endarterectomy    Mixed hyperlipidemia    Essential hypertension    Thrombocytopenia (HCC)    GERD (gastroesophageal reflux disease)     No Known Allergies  Current Outpatient Medications on File Prior to Visit   Medication Sig    amLODIPine (NORVASC) 10 mg tablet amlodipine 10 mg tablet    aspirin (ASPIRIN 81) 81 mg chewable tablet Chew 1 tablet (81 mg total) daily    atorvastatin (LIPITOR) 40 mg tablet TAKE 1 TABLET BY MOUTH EVERY DAY AT NIGHT    brimonidine (ALPHAGAN P) 0 15 % ophthalmic solution brimonidine 0 15 % eye drops    carvedilol (COREG) 25 mg tablet Take 25 mg by mouth 2 (two) times a day    pantoprazole (PROTONIX) 40 mg tablet Take 1 tablet (40 mg total) by mouth daily    tamsulosin (FLOMAX) 0 4 mg TAKE 1 CAPSULE BY MOUTH EVERY DAY WITH DINNER    latanoprost (XALATAN) 0 005 % ophthalmic solution INSTILL 1 DROP INTO RIGHT EYE EVERY DAY (Patient not taking: Reported on 2022)    Latanoprost 0 005 % EMUL latanoprost 0 005 % eye drops (Patient not taking: Reported on 2022)    losartan (COZAAR) 100 MG tablet Take 100 mg by mouth daily     No current facility-administered medications on file prior to visit       Family History   Problem Relation Age of Onset    Cancer Father         lung    Hypertension Mother      Past Medical History:   Diagnosis Date    COVID-19 vaccine administered     2 doses Moderna    GI bleed     Heart murmur     History of transfusion     with bleeding ulcer, no reaction    Hyperlipidemia     Hypertension     Prostate cancer ([facility] [address] )      Social History     Socioeconomic History    Marital status: /Civil Union     Spouse name: None    Number of children: None    Years of education: None    Highest education level: None   Occupational History    None   Tobacco Use    Smoking status: Light Tobacco Smoker     Types: Cigars     Last attempt to quit:      Years since quittin 0    Smokeless tobacco: Never Used    Tobacco comment: rarely, a cigar   Substance and Sexual Activity    Alcohol use: Yes     Comment: 1 drink of vodka daily     Drug use: No    Sexual activity: Yes   Other Topics Concern    None   Social History Narrative    None     Social Determinants of Health     Financial Resource Strain: Not on file   Food Insecurity: Not on file   Transportation Needs: Not on file   Physical Activity: Not on file   Stress: Not on file   Social Connections: Not on file   Intimate Partner Violence: Not on file   Housing Stability: Not on file     Past Surgical History:   Procedure Laterality Date    AORTIC VALVE SURGERY      BACK SURGERY  2014    CAROTID ARTERY ANGIOPLASTY      COLONOSCOPY      ESOPHAGOGASTRODUODENOSCOPY N/A 2/20/2018    Procedure: ESOPHAGOGASTRODUODENOSCOPY (EGD) with gold probe, hemoclip, 7ml epi injection  and bx;  Surgeon: Dangelo Rogel MD;  Location: [facility]; Service: Gastroenterology    SKIN LESION EXCISION Right 10/14/2021    Procedure: EXCISION CHEEK LESION WITH FROZEN SECTION;  Surgeon: Mery Gilbert DO;  Location: [facility] MAIN OR;  Service: Plastics    UPPER GASTROINTESTINAL ENDOSCOPY           Review of Systems   All other systems reviewed and are negative  Objective:      /76   Pulse 67   Wt 97 5 kg (215 lb)   BMI 30 85 kg/m²          Physical Exam  Constitutional:       Appearance: Normal appearance  He is well-developed  HENT:      Head: Normocephalic and atraumatic  Eyes:      Conjunctiva/sclera: Conjunctivae normal    Cardiovascular:      Rate and Rhythm: Normal rate and regular rhythm  Pulmonary:      Effort: Pulmonary effort is normal       Breath sounds: Normal breath sounds  Abdominal:      General: Bowel sounds are normal  There is no distension  Palpations: Abdomen is soft  Tenderness: There is no abdominal tenderness  Musculoskeletal:         General: Normal range of motion  Cervical back: Normal range of motion  Skin:     General: Skin is warm and dry  Neurological:      Mental Status: He is alert and oriented to person, place, and time     Psychiatric:         Mood and Affect: Mood normal          Behavior: Behavior normal

## 2022-03-27 DIAGNOSIS — C61 MALIGNANT TUMOR OF PROSTATE (HCC): ICD-10-CM

## 2022-03-27 RX ORDER — TAMSULOSIN HYDROCHLORIDE 0.4 MG/1
CAPSULE ORAL
Qty: 90 CAPSULE | Refills: 3 | Status: SHIPPED | OUTPATIENT
Start: 2022-03-27

## 2022-04-04 ENCOUNTER — HOSPITAL ENCOUNTER (OUTPATIENT)
Dept: NON INVASIVE DIAGNOSTICS | Facility: CLINIC | Age: 82
Discharge: HOME | End: 2022-04-04
Payer: MEDICARE

## 2022-04-04 DIAGNOSIS — I65.23 BILATERAL CAROTID ARTERY STENOSIS: ICD-10-CM

## 2022-04-04 PROCEDURE — 93880 EXTRACRANIAL BILAT STUDY: CPT

## 2022-04-04 PROCEDURE — 93880 EXTRACRANIAL BILAT STUDY: CPT | Performed by: SURGERY

## 2022-04-05 ENCOUNTER — TRANSCRIBE ORDERS (OUTPATIENT)
Dept: VASCULAR SURGERY | Facility: CLINIC | Age: 82
End: 2022-04-05

## 2022-04-05 DIAGNOSIS — I65.23 BILATERAL CAROTID ARTERY STENOSIS: Primary | ICD-10-CM

## 2022-04-16 DIAGNOSIS — Z87.19 H/O: UPPER GI BLEED: ICD-10-CM

## 2022-04-16 DIAGNOSIS — Z87.19 HISTORY OF DUODENAL ULCER: ICD-10-CM

## 2022-04-16 RX ORDER — PANTOPRAZOLE SODIUM 40 MG/1
TABLET, DELAYED RELEASE ORAL
Qty: 90 TABLET | Refills: 0 | Status: SHIPPED | OUTPATIENT
Start: 2022-04-16 | End: 2022-07-17

## 2022-05-27 ENCOUNTER — OFFICE VISIT (OUTPATIENT)
Dept: VASCULAR SURGERY | Facility: CLINIC | Age: 82
End: 2022-05-27
Payer: MEDICARE

## 2022-05-27 VITALS
DIASTOLIC BLOOD PRESSURE: 78 MMHG | SYSTOLIC BLOOD PRESSURE: 120 MMHG | HEART RATE: 68 BPM | BODY MASS INDEX: 32.24 KG/M2 | OXYGEN SATURATION: 95 % | WEIGHT: 225.2 LBS | HEIGHT: 70 IN

## 2022-05-27 DIAGNOSIS — E78.2 MIXED HYPERLIPIDEMIA: ICD-10-CM

## 2022-05-27 DIAGNOSIS — I10 ESSENTIAL HYPERTENSION: ICD-10-CM

## 2022-05-27 DIAGNOSIS — I65.23 BILATERAL CAROTID ARTERY STENOSIS: Primary | ICD-10-CM

## 2022-05-27 PROCEDURE — 99203 OFFICE O/P NEW LOW 30 MIN: CPT

## 2022-05-27 NOTE — PROGRESS NOTES
Assessment/Plan:    Bilateral carotid artery stenosis  81yoM with PMHx significant for GERD, HTN, HLD, thrombocytopenia, prostate cancer, asymptomatic carotid artery disease s/p R CEA in 2016 @ OSH in [place]  He presents today for results review on non-invasive imaging done on 4/4/22    -CV duplex 4/4/22 showed R ICA with a widely patent endarterectomy site  L ICA <50% stenosis  -Asymptomatic  Denies TIA/CVA  Neurological exam at baseline    -Discussed the pathophysiology of carotid stenosis, available treatment options, and indications for intervention    -Obtain updated carotid artery duplex in 1 year to evaluate ICA velocities and progression of disease    -Continue medical optimization with ASA 81 mg and statin    -Educated on s/sx of worsening condition, including TIA/Stroke-like symptoms, and to call 911 or go to the ED if symptoms occur  -F/u in 1 year or sooner if needed   -Instructed patient to contact office with questions, concerns, or new symptoms  Essential hypertension  -BP stable in office today   -Continue adequate BP control and monitoring   -Medical management per PCP       Mixed hyperlipidemia  -Stable   -Encourage low cholesterol, low fat diet  -Continue with statin therapy  -Medical Management per PCP          Diagnoses and all orders for this visit:    Bilateral carotid artery stenosis    Essential hypertension    Mixed hyperlipidemia          Subjective:      Patient ID: Phi Mcguire is a 80 y o  male  HPI  Patient presents today to review CV duplex done on 4/4/22  He denies TIA/CVA symptoms  He denies any acute concerns at this time  He denies any headache, dizziness, sudden loss of vision, facial drooping, slurred speech  CV duplex was reviewed with patient today  R ICA with a widely patent endarterectomy site and L ICA with <50% stenosis  Pt was educated on s/sx of TIA or stroke and instructed to call 911 if symptoms occur  Currently taking Atorvastatin and ASA 81   He is a former smoker  The following portions of the patient's history were reviewed and updated as appropriate: allergies, current medications, past family history, past medical history, past social history, past surgical history and problem list     Review of Systems   Constitutional: Negative  HENT: Negative  Eyes: Negative  Respiratory: Negative  Cardiovascular: Negative  Gastrointestinal: Negative  Endocrine: Negative  Genitourinary: Negative  Musculoskeletal: Negative  Skin: Negative  Allergic/Immunologic: Negative  Neurological: Negative  Hematological: Negative  Psychiatric/Behavioral: Negative  Objective:      Vitals:    05/27/22 0809 05/27/22 0819   BP: 130/80 120/78   BP Location: Left arm Right arm   Patient Position: Sitting Sitting   Cuff Size: Standard Standard   Pulse: 68    SpO2: 95%    Weight: 102 kg (225 lb 3 2 oz)    Height: 5' 10" (1 778 m)        Patient Active Problem List   Diagnosis    Acute blood loss anemia    Duodenal ulcer    Malignant tumor of prostate ([address]    Bilateral carotid artery stenosis    History of right-sided carotid endarterectomy    Mixed hyperlipidemia    Essential hypertension    Thrombocytopenia (HCC)    GERD (gastroesophageal reflux disease)       Past Surgical History:   Procedure Laterality Date    AORTIC VALVE SURGERY      BACK SURGERY  2014    CAROTID ARTERY ANGIOPLASTY      COLONOSCOPY      ESOPHAGOGASTRODUODENOSCOPY N/A 2/20/2018    Procedure: ESOPHAGOGASTRODUODENOSCOPY (EGD) with gold probe, hemoclip, 7ml epi injection  and bx;  Surgeon: Dina Gibson MD;  Location: [facility];   Service: Gastroenterology    SKIN LESION EXCISION Right 10/14/2021    Procedure: EXCISION CHEEK LESION WITH FROZEN SECTION;  Surgeon: Inez Kim DO;  Location: [address] [place] [address];  Service: Plastics    UPPER GASTROINTESTINAL ENDOSCOPY         Family History   Problem Relation Age of Onset    Hypertension Mother     Cancer Father lung       Social History     Socioeconomic History    Marital status: /Civil Union     Spouse name: Not on file    Number of children: Not on file    Years of education: Not on file    Highest education level: Not on file   Occupational History    Not on file   Tobacco Use    Smoking status: Former Smoker     Types: Cigars     Quit date:      Years since quittin 4    Smokeless tobacco: Never Used    Tobacco comment: rarely, a cigar   Vaping Use    Vaping Use: Never used   Substance and Sexual Activity    Alcohol use: Yes     Comment: 1 drink of vodka daily     Drug use: No    Sexual activity: Yes   Other Topics Concern    Not on file   Social History Narrative    Not on file     Social Determinants of Health     Financial Resource Strain: Not on file   Food Insecurity: Not on file   Transportation Needs: Not on file   Physical Activity: Not on file   Stress: Not on file   Social Connections: Not on file   Intimate Partner Violence: Not on file   Housing Stability: Not on file       No Known Allergies      Current Outpatient Medications:     amLODIPine (NORVASC) 10 mg tablet, amlodipine 10 mg tablet, Disp: , Rfl:     aspirin (ASPIRIN 81) 81 mg chewable tablet, Chew 1 tablet (81 mg total) daily, Disp: , Rfl: 0    atorvastatin (LIPITOR) 40 mg tablet, TAKE 1 TABLET BY MOUTH EVERY DAY AT NIGHT, Disp: , Rfl:     brimonidine (ALPHAGAN P) 0 15 % ophthalmic solution, brimonidine 0 15 % eye drops, Disp: , Rfl:     carvedilol (COREG) 25 mg tablet, Take 25 mg by mouth 2 (two) times a day, Disp: , Rfl:     pantoprazole (PROTONIX) 40 mg tablet, TAKE 1 TABLET BY MOUTH EVERY DAY, Disp: 90 tablet, Rfl: 0    tamsulosin (FLOMAX) 0 4 mg, TAKE 1 CAPSULE BY MOUTH EVERY DAY WITH DINNER, Disp: 90 capsule, Rfl: 3    latanoprost (XALATAN) 0 005 % ophthalmic solution, INSTILL 1 DROP INTO RIGHT EYE EVERY DAY (Patient not taking: No sig reported), Disp: , Rfl: 5    Latanoprost 0 005 % EMUL, latanoprost 0 005 % eye drops (Patient not taking: No sig reported), Disp: , Rfl:     losartan (COZAAR) 100 MG tablet, Take 100 mg by mouth daily, Disp: , Rfl:       /78 (BP Location: Right arm, Patient Position: Sitting, Cuff Size: Standard)   Pulse 68   Ht 5' 10" (1 778 m)   Wt 102 kg (225 lb 3 2 oz)   SpO2 95%   BMI 32 31 kg/m²          Physical Exam  Vitals and nursing note reviewed  Constitutional:       Appearance: Normal appearance  HENT:      Head: Normocephalic and atraumatic  Eyes:      Extraocular Movements: Extraocular movements intact  Pupils: Pupils are equal, round, and reactive to light  Neck:      Vascular: No carotid bruit  Cardiovascular:      Rate and Rhythm: Normal rate and regular rhythm  Pulses:           Carotid pulses are 2+ on the right side and 2+ on the left side  Radial pulses are 2+ on the right side and 2+ on the left side  Heart sounds: Normal heart sounds  Comments: No carotid bruit   Pulmonary:      Effort: Pulmonary effort is normal  No respiratory distress  Breath sounds: Normal breath sounds  Abdominal:      General: Bowel sounds are normal  There is no distension  Palpations: Abdomen is soft  Comments: No abdominal bruit or pulsatile masses  Musculoskeletal:         General: No swelling  Normal range of motion  Cervical back: Normal range of motion and neck supple  Right lower leg: No edema  Left lower leg: No edema  Skin:     General: Skin is warm  Capillary Refill: Capillary refill takes less than 2 seconds  Neurological:      General: No focal deficit present  Mental Status: He is alert and oriented to person, place, and time     Psychiatric:         Mood and Affect: Mood normal          Behavior: Behavior normal            Sea Deras PA-C  [facility]  [phone]

## 2022-07-17 DIAGNOSIS — Z87.19 H/O: UPPER GI BLEED: ICD-10-CM

## 2022-07-17 DIAGNOSIS — Z87.19 HISTORY OF DUODENAL ULCER: ICD-10-CM

## 2022-07-17 RX ORDER — PANTOPRAZOLE SODIUM 40 MG/1
TABLET, DELAYED RELEASE ORAL
Qty: 90 TABLET | Refills: 0 | Status: SHIPPED | OUTPATIENT
Start: 2022-07-17 | End: 2022-10-14

## 2022-10-14 DIAGNOSIS — Z87.19 HISTORY OF DUODENAL ULCER: ICD-10-CM

## 2022-10-14 DIAGNOSIS — Z87.19 H/O: UPPER GI BLEED: ICD-10-CM

## 2022-10-14 RX ORDER — PANTOPRAZOLE SODIUM 40 MG/1
TABLET, DELAYED RELEASE ORAL
Qty: 90 TABLET | Refills: 0 | Status: SHIPPED | OUTPATIENT
Start: 2022-10-14 | End: 2022-10-30

## 2022-10-30 DIAGNOSIS — Z87.19 H/O: UPPER GI BLEED: ICD-10-CM

## 2022-10-30 DIAGNOSIS — Z87.19 HISTORY OF DUODENAL ULCER: ICD-10-CM

## 2022-10-30 RX ORDER — PANTOPRAZOLE SODIUM 40 MG/1
TABLET, DELAYED RELEASE ORAL
Qty: 90 TABLET | Refills: 0 | Status: SHIPPED | OUTPATIENT
Start: 2022-10-30

## 2022-11-25 NOTE — PLAN OF CARE
DISCHARGE PLANNING     Discharge to home or other facility with appropriate resources Progressing        HEMATOLOGIC - ADULT     Maintains hematologic stability Progressing        Knowledge Deficit     Patient/family/caregiver demonstrates understanding of disease process, treatment plan, medications, and discharge instructions Progressing        MUSCULOSKELETAL - ADULT     Maintain proper alignment of affected body part Progressing        PAIN - ADULT     Verbalizes/displays adequate comfort level or baseline comfort level Progressing        Potential for Falls     Patient will remain free of falls Progressing 21.9

## 2022-12-01 ENCOUNTER — OFFICE VISIT (OUTPATIENT)
Dept: UROLOGY | Facility: CLINIC | Age: 82
End: 2022-12-01

## 2022-12-01 VITALS
HEART RATE: 61 BPM | BODY MASS INDEX: 32.57 KG/M2 | WEIGHT: 227 LBS | OXYGEN SATURATION: 96 % | SYSTOLIC BLOOD PRESSURE: 140 MMHG | DIASTOLIC BLOOD PRESSURE: 84 MMHG

## 2022-12-01 DIAGNOSIS — C61 MALIGNANT TUMOR OF PROSTATE (HCC): Primary | ICD-10-CM

## 2022-12-01 DIAGNOSIS — C61 PROSTATE CANCER (HCC): ICD-10-CM

## 2022-12-01 NOTE — PROGRESS NOTES
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 treated with combination ADT and brachytherapy by Dr Issa Bowers at that time  PSA has remained low and stable around 0 2-0 3 over the past two decades  He uses every other day Flomax for mild urinary symptoms also for several years as well with this with no adverse side effects  He has not had hematuria no urinary tract infection retention or flank issues over the years  He reports good erections, able to perform penetrative sex with his wife no meds  He still exercises regularly and is working at the age of 80  Review of Systems   Constitutional: Negative for activity change, appetite change, chills, fever and unexpected weight change  HENT: Negative  Respiratory: Negative  Negative for shortness of breath  Cardiovascular: Negative  Negative for chest pain  Gastrointestinal: Negative for abdominal pain, diarrhea, nausea and vomiting  Endocrine: Negative  Genitourinary: Negative for decreased urine volume, difficulty urinating, dysuria, flank pain, frequency, hematuria, testicular pain and urgency  Musculoskeletal: Negative for back pain and gait problem  Skin: Negative  Allergic/Immunologic: Negative  Neurological: Negative  Hematological: Negative for adenopathy  Does not bruise/bleed easily                  Vitals  Vitals:    12/01/22 1145   BP: 140/84   BP Location: Right arm   Patient Position: Sitting   Cuff Size: Adult   Pulse: 61   SpO2: 96%   Weight: 103 kg (227 lb)       Physical Exam  Vitals and nursing note reviewed  Constitutional:       General: He is not in acute distress  Appearance: Normal appearance  He is well-developed  He is not diaphoretic  HENT:      Head: Normocephalic and atraumatic  Pulmonary:      Effort: Pulmonary effort is normal       Comments: No cough or audible wheeze  Abdominal:      General: There is no distension  Tenderness: There is no abdominal tenderness  There is no right CVA tenderness or left CVA tenderness  Genitourinary:     Comments: Circumcised penis, normal phallus, orthotopic patent meatus  Testes smooth descended bilaterally into the scrotum nontender with no palpable mass  Digital rectal exam smooth prostate, without appreciable nodule, induration or asymmetry  Musculoskeletal:      Right lower leg: No edema  Left lower leg: No edema  Skin:     General: Skin is warm and dry  Neurological:      Mental Status: He is alert and oriented to person, place, and time        Gait: Gait normal    Psychiatric:         Speech: Speech normal          Behavior: Behavior normal            Past History  Past Medical History:   Diagnosis Date   • COVID-19 vaccine administered     2 doses Moderna   • GI bleed    • Heart murmur    • History of transfusion     with bleeding ulcer, no reaction   • Hyperlipidemia    • Hypertension    • [facility] )      Social History     Socioeconomic History   • Marital status: /Civil Union     Spouse name: None   • Number of children: None   • Years of education: None   • Highest education level: None   Occupational History   • None   Tobacco Use   • Smoking status: Former     Types: Cigars     Quit date:      Years since quittin 9   • Smokeless tobacco: Never   • Tobacco comments:     rarely, a cigar   Vaping Use   • Vaping Use: Never used   Substance and Sexual Activity   • Alcohol use: Yes     Comment: 1 drink of vodka daily    • Drug use: No   • Sexual activity: Yes     Partners: Female   Other Topics Concern   • None   Social History Narrative   • None     Social Determinants of Health     Financial Resource Strain: Not on file   Food Insecurity: Not on file   Transportation Needs: Not on file   Physical Activity: Not on file   Stress: Not on file   Social Connections: Not on file   Intimate Partner Violence: Not on file   Housing Stability: Not on file     Social History     Tobacco Use   Smoking Status Former   • Types: Cigars   • Quit date:    • Years since quittin 9   Smokeless Tobacco Never   Tobacco Comments    rarely, a cigar     Family History   Problem Relation Age of Onset   • Hypertension Mother    • Cancer Father         lung       The following portions of the patient's history were reviewed and updated as appropriate: allergies, current medications, past medical history, past social history, past surgical history and problem list     Results  No results found for this or any previous visit (from the past 1 hour(s)) ]  Lab Results   Component Value Date    PSA 0 2 2021     Lab Results   Component Value Date    CALCIUM 8 6 2018    K 3 7 2018    CO2 25 2018     2018    BUN 11 2018    CREATININE 0 77 2018     Lab Results   Component Value Date    WBC 5 96 2018    HGB 8 8 (L) 2018    HCT 26 5 (L) 2018    MCV 96 2018     2018

## 2023-04-24 ENCOUNTER — TELEPHONE (OUTPATIENT)
Dept: VASCULAR SURGERY | Facility: CLINIC | Age: 83
End: 2023-04-24

## 2023-04-24 NOTE — TELEPHONE ENCOUNTER
Attempted to contact patient to schedule appointment(s) listed below  Requested patient call [phone] option 3 to schedule appointment(s)  Patient's appointment(s) are due on or after 5/27/23      Dopplers  [] Abdominal Aorta Iliac (AOIL)  [] Carotid (CV)   [] Celiac and/or Mesenteric  [] Endovascular Aortic Repair (EVAR)   [] Hemodialysis Access (HD)   [] Lower Limb Arterial (MARCY)  [] Lower Limb Venous (LEV)  [] Lower Limb Venous Duplex with Reflux (LEVDR)  [] Renal Artery  [] Upper Limb Arterial (UEA)    [] Upper Limb Venous (UEV)              [] KAYLEIGH and Waveform analysis     Advanced Imaging   [] CTA head/neck    [] CTA abdomen    [] CTA abdomen & pelvis    [] CT abdomen with/ without contrast  [] CT abdomen with contrast  [] CT abdomen without contrast    [] CT abdomen & pelvis with/ without contrast  [] CT abdomen & pelvis with contrast  [] CT abdomen & pelvis without contrast    Office Visit   [] New patient, patient last seen over 3 years ago  [] Risk factor modification (RFM)   [x] Follow up   [] Lost to follow up (LTFU)  Called patient & LMOM to schedule 1 yr fu/Rev CV 4/21/23

## 2023-05-12 ENCOUNTER — TELEPHONE (OUTPATIENT)
Dept: VASCULAR SURGERY | Facility: CLINIC | Age: 83
End: 2023-05-12

## 2023-05-12 NOTE — TELEPHONE ENCOUNTER
Spoke w/ pt he does not want to come in for an ov when he was called and told results were fine and he doesn't need to be seen for a year

## 2023-07-30 DIAGNOSIS — C61 MALIGNANT TUMOR OF PROSTATE (HCC): ICD-10-CM

## 2023-07-31 RX ORDER — TAMSULOSIN HYDROCHLORIDE 0.4 MG/1
0.4 CAPSULE ORAL
Qty: 90 CAPSULE | Refills: 0 | Status: SHIPPED | OUTPATIENT
Start: 2023-07-31

## 2023-12-11 ENCOUNTER — TELEPHONE (OUTPATIENT)
Age: 83
End: 2023-12-11

## 2023-12-11 NOTE — TELEPHONE ENCOUNTER
Pt called asking to be seen for spots on left cheek. He has personal history of cancer. Spots have been there about a year.      Should he be seen soon or should he be put on the wait list?    Please advise

## 2023-12-12 ENCOUNTER — NURSE TRIAGE (OUTPATIENT)
Age: 83
End: 2023-12-12

## 2023-12-12 NOTE — TELEPHONE ENCOUNTER
PT has soc on his left cheek that is smooth and crusty in appearance. He has had it for awhile and would like Dr. Merlinda Lofts to look at it since she removed a previous skin cancer 2 years ago. PT wanted apt after the holidays. Pt denies fevers or any other symptoms. Reason for Disposition   Sticks up out of the skin (elevated), and feels rough to the touch    Answer Assessment - Initial Assessment Questions  1. APPEARANCE of LESION: "What does it look like?"       Crusty, smooth  2. SIZE: "How big is it?" (e.g., compare to size of pinhead, tip of pen, eraser, coin, pea, grape, ping pong ball; or size in cms or inches)       pinhead  3. COLOR: "What color is it?" "Is there more than one color?"      flesh  4. SHAPE: "What shape is it?" (e.g., round, irregular)      round  5. RAISED: "Does it stick up above the skin or is it flat?" (e.g., raised or elevated)      Half and half  6. TENDER: "Does it hurt when you touch it?"  (Scale 1-10; or mild, moderate, severe)      denies  7. LOCATION: "Where is it located?"       A couple of months ago  8. ONSET: "When did it first appear?"       L cheek  9. NUMBER: "Is there just one?" or "Are there others?"      1  10. CAUSE: "What do you think it is?"        Unsure, hx of skin cancer  11.  OTHER SYMPTOMS: "Do you have any other symptoms?" (e.g., fever)        fever    Protocols used: Skin Lesion - Moles or Growths-ADULT-[place]

## 2024-01-10 ENCOUNTER — OFFICE VISIT (OUTPATIENT)
Dept: PLASTIC SURGERY | Facility: CLINIC | Age: 84
End: 2024-01-10
Payer: COMMERCIAL

## 2024-01-10 VITALS
BODY MASS INDEX: 30.94 KG/M2 | HEIGHT: 71 IN | DIASTOLIC BLOOD PRESSURE: 85 MMHG | SYSTOLIC BLOOD PRESSURE: 143 MMHG | HEART RATE: 72 BPM | WEIGHT: 221 LBS | TEMPERATURE: 97.6 F

## 2024-01-10 DIAGNOSIS — L98.9 SKIN LESION: Primary | ICD-10-CM

## 2024-01-10 PROCEDURE — 99203 OFFICE O/P NEW LOW 30 MIN: CPT | Performed by: PHYSICIAN ASSISTANT

## 2024-01-10 PROCEDURE — 11104 PUNCH BX SKIN SINGLE LESION: CPT | Performed by: PHYSICIAN ASSISTANT

## 2024-01-10 RX ORDER — AMOXICILLIN 500 MG/1
CAPSULE ORAL
COMMUNITY
Start: 2024-01-08

## 2024-01-10 RX ORDER — SCOLOPAMINE TRANSDERMAL SYSTEM 1 MG/1
1 PATCH, EXTENDED RELEASE TRANSDERMAL
COMMUNITY
Start: 2023-08-14 | End: 2024-08-13

## 2024-01-10 NOTE — PROGRESS NOTES
Biopsy    Date/Time: 1/10/2024 10:30 AM    Performed by: Edilma Morelos PA-C  Authorized by: Edilma Morelos PA-C  Universal Protocol:  Consent: Verbal consent obtained.  Consent given by: patient  Patient understanding: patient states understanding of the procedure being performed    Procedure Details - Lesion Biopsy:     Body area:  Head/neck    Head/neck location:  L cheek    Biopsy method: punch biopsy      Biopsy tissue type: skin and subcutaneous    Initial size (mm):  5    Final defect size (mm):  4    Malignancy: malignancy unknown       Closed with 1x prolene

## 2024-01-10 NOTE — PROGRESS NOTES
"Assessment/Plan:    83 year old male with pmh of squamous cell carcinoma of face, presenting with new lesion to left cheek    - punch biopsy performed  - patient to return in 1 week for suture removal  - bacitracin to area daily and cover with bandaid. Wash face daily  - will call patient with results of biopsy    Subjective:     HPI    83 year old pleasant male presenting with new left cheek lesion. He was previously seen by Dr. Funk for excision of his right cheek squamous cell carcinoma 2 years prior. He has noticed this new area within the past year. Area does not bleed but will get dry. He has not followed with dermatology. He did request freezing which I explained we do not do. We discussed a biopsy with possible future excision if needed. I did stress the importance of following with dermatology for full body surveillance due to his history of skin cancer. He would like to have this lesion examined with definitive plan prior to setting up an appointment with dermatology.     Review of Systems   Constitutional:  Negative for activity change, chills and fatigue.   HENT:  Negative for congestion and sore throat.    Respiratory:  Negative for chest tightness and shortness of breath.    Cardiovascular:  Negative for chest pain.   Gastrointestinal:  Negative for abdominal pain, diarrhea and vomiting.   Skin:         Left cheek skin lesion   Psychiatric/Behavioral:  Negative for behavioral problems and confusion.          Objective:      /85   Pulse 72   Temp 97.6 °F (36.4 °C)   Ht 5' 10.5\" (1.791 m)   Wt 100 kg (221 lb)   BMI 31.26 kg/m²          Physical Exam  Constitutional:       Appearance: Normal appearance. He is normal weight.   Skin:     Findings: Lesion (5mm concave, pearly, irregular border lesion) present.   Neurological:      Mental Status: He is alert.           "

## 2024-01-16 ENCOUNTER — OFFICE VISIT (OUTPATIENT)
Dept: PLASTIC SURGERY | Facility: CLINIC | Age: 84
End: 2024-01-16

## 2024-01-16 DIAGNOSIS — L98.9 SKIN LESION: Primary | ICD-10-CM

## 2024-01-16 PROCEDURE — 99024 POSTOP FOLLOW-UP VISIT: CPT | Performed by: PHYSICIAN ASSISTANT

## 2024-01-16 NOTE — PROGRESS NOTES
Assessment/Plan:     Patient is an 83 YOM s/p left cheek punch biopsy on 1/10/24. Please see HPI.     Patient returns to the office today for suture removal. We will call him when pathology has been resulted.       Diagnoses and all orders for this visit:    Skin lesion          Subjective:     Patient ID: Edgar Agarwal is a 83 y.o. male.    HPI    Patient reports no issues or concerns with exception of soreness at the biopsy site.     Review of Systems    See HPI     Objective:     Physical Exam      Incision and suture is clean, dry and intact.

## 2024-04-26 ENCOUNTER — HOSPITAL ENCOUNTER (OUTPATIENT)
Dept: NON INVASIVE DIAGNOSTICS | Facility: CLINIC | Age: 84
Discharge: HOME | End: 2024-04-26
Payer: COMMERCIAL

## 2024-04-26 DIAGNOSIS — I65.23 BILATERAL CAROTID ARTERY STENOSIS: ICD-10-CM

## 2024-04-26 PROCEDURE — 93880 EXTRACRANIAL BILAT STUDY: CPT

## 2024-04-26 PROCEDURE — 93880 EXTRACRANIAL BILAT STUDY: CPT | Performed by: SURGERY

## 2024-05-01 ENCOUNTER — TELEPHONE (OUTPATIENT)
Dept: VASCULAR SURGERY | Facility: CLINIC | Age: 84
End: 2024-05-01

## 2024-05-01 NOTE — TELEPHONE ENCOUNTER
Attempted to contact patient to schedule appointment(s) listed below.  Requested patient call [phone] option 3 to schedule appointment(s).    Patient's appointment(s) are past due, expected ASAP.    Dopplers  [] Abdominal Aorta Iliac (AOIL)  [] Carotid (CV)   [] Celiac and/or Mesenteric  [] Endovascular Aortic Repair (EVAR)   [] Hemodialysis Access (HD)   [] Lower Limb Arterial (MARCY)  [] Lower Limb Venous (LEV)  [] Lower Limb Venous Duplex with Reflux (LEVDR)  [] Renal Artery  [] Upper Limb Arterial (UEA)    [] Upper Limb Venous (UEV)              [] KAYLEIGH and Waveform analysis     Advanced Imaging   [] CTA head/neck    [] CTA abdomen    [] CTA abdomen & pelvis    [] CT abdomen with/ without contrast  [] CT abdomen with contrast  [] CT abdomen without contrast    [] CT abdomen & pelvis with/ without contrast  [] CT abdomen & pelvis with contrast  [] CT abdomen & pelvis without contrast    Office Visit   [] New patient, patient last seen over 3 years ago  [] Risk factor modification (RFM)   [x] Follow up   [] Lost to follow up (LTFU)   Called patient & LMOM to schedule ov fu/PT l/s 5/27/22 w/TALIB LYMAN/RR CV 4/26/24

## 2024-05-24 ENCOUNTER — TELEPHONE (OUTPATIENT)
Age: 84
End: 2024-05-24

## 2024-05-24 DIAGNOSIS — C61 PROSTATE CANCER (HCC): Primary | ICD-10-CM

## 2024-05-24 NOTE — TELEPHONE ENCOUNTER
Patient seen by Jazmin at [facility]    Patient called stating he needs an order for psa to do prior to appointment in November.

## 2024-06-06 ENCOUNTER — OFFICE VISIT (OUTPATIENT)
Dept: VASCULAR SURGERY | Facility: CLINIC | Age: 84
End: 2024-06-06
Payer: COMMERCIAL

## 2024-06-06 VITALS
HEART RATE: 68 BPM | HEIGHT: 71 IN | DIASTOLIC BLOOD PRESSURE: 102 MMHG | WEIGHT: 220 LBS | BODY MASS INDEX: 30.8 KG/M2 | SYSTOLIC BLOOD PRESSURE: 186 MMHG | OXYGEN SATURATION: 95 %

## 2024-06-06 DIAGNOSIS — I65.23 BILATERAL CAROTID ARTERY STENOSIS: Primary | ICD-10-CM

## 2024-06-06 DIAGNOSIS — E78.2 MIXED HYPERLIPIDEMIA: ICD-10-CM

## 2024-06-06 DIAGNOSIS — I10 ESSENTIAL HYPERTENSION: ICD-10-CM

## 2024-06-06 PROCEDURE — 99213 OFFICE O/P EST LOW 20 MIN: CPT

## 2024-06-06 NOTE — PROGRESS NOTES
Ambulatory Visit  Name: Edgar Agarwal      : 1940      MRN: [id number]  Encounter Provider: PASTORA Jones  Encounter Date: 2024   Encounter department: [facility]    Assessment & Plan   1. Bilateral carotid artery stenosis  Assessment & Plan:  Patient is denying any concerning symptoms at this time. Patient is denying any TIA/CVA symptoms (amaurosis fugax, slurred speech, facial droop, dysphagia, unilateral weakness, headache).  Patient reports that he exercises regularly and plays golf daily.  He remains active and independent with ADLs.    Imaging:  Carotid duplex 2024:  Widely patent right ICA and endarterectomy site, <50% stenosis left ICA    Plan:  -We discussed the pathophysiology of carotid artery disease as well as contributing lifestyle factors.  -We discussed the results of carotid duplex at length.  Widely patent right ICA and endarterectomy site, mild plaque to left ICA.  -Continue medical optimization with aspirin and atorvastatin.  -Will repeat carotid duplex in 1 year.  -Instructed patient to report to the ED for any TIA/CVA symptoms.  -Follow up in the office in 1 year    Orders:  -     VAS carotid complete study; Future; Expected date: 2025  2. Essential hypertension  Assessment & Plan:  Patient's blood pressure is elevated in the office today, 186/102.  Blood pressure recheck 160/98.    -Continue medical management per PCP.  -Low-salt diet  3. Mixed hyperlipidemia  Assessment & Plan:  Continue atorvastatin.      History of Present Illness     Edgar Agarwal is a 83 y.o. male, former smoker, with PMH HTN, HLD, prostate cancer, aortic valve stenosis, s/p aortic valve replacement, and asymptomatic bilateral carotid artery stenosis s/p right CEA.  Patient is presenting to the vascular surgery office to review results of carotid duplex completed 2024.    Patient is denying any concerning symptoms at this time. Patient is denying any TIA/CVA  symptoms (amaurosis fugax, slurred speech, facial droop, dysphagia, unilateral weakness, headache).  Patient reports that he exercises regularly and plays golf daily.  He remains active and independent with ADLs.    Review of Systems   Constitutional: Negative.    HENT: Negative.  Negative for trouble swallowing.    Eyes: Negative.  Negative for visual disturbance.   Respiratory: Negative.  Negative for shortness of breath.    Cardiovascular: Negative.  Negative for chest pain.   Gastrointestinal: Negative.    Endocrine: Negative.    Genitourinary: Negative.    Musculoskeletal: Negative.    Skin: Negative.    Allergic/Immunologic: Negative.    Neurological: Negative.  Negative for facial asymmetry, speech difficulty, weakness and numbness.   Hematological: Negative.    Psychiatric/Behavioral: Negative.       Pertinent Medical History   Past Medical History:   Diagnosis Date    COVID-19 vaccine administered     2 doses Moderna    GI bleed     Heart murmur     History of transfusion     with bleeding ulcer, no reaction    Hyperlipidemia     Hypertension     Prostate cancer (HCC)        Medical History Reviewed by provider this encounter:       Current Outpatient Medications on File Prior to Visit   Medication Sig Dispense Refill    amLODIPine (NORVASC) 10 mg tablet amlodipine 10 mg tablet      aspirin (ASPIRIN 81) 81 mg chewable tablet Chew 1 tablet (81 mg total) daily  0    atorvastatin (LIPITOR) 40 mg tablet TAKE 1 TABLET BY MOUTH EVERY DAY AT NIGHT      brimonidine (ALPHAGAN P) 0.15 % ophthalmic solution       carvedilol (COREG) 25 mg tablet Take 25 mg by mouth 2 (two) times a day      pantoprazole (PROTONIX) 40 mg tablet TAKE 1 TABLET BY MOUTH EVERY DAY 90 tablet 0    tamsulosin (FLOMAX) 0.4 mg TAKE ONE CAPSULE BY MOUTH EVERY DAY WITH DINNER 90 capsule 0    amoxicillin (AMOXIL) 500 mg capsule  (Patient not taking: Reported on 6/6/2024)      latanoprost (XALATAN) 0.005 % ophthalmic solution Administer to both eyes  "daily (Patient not taking: Reported on 1/10/2024)  5    losartan (COZAAR) 100 MG tablet Take 100 mg by mouth daily (Patient not taking: Reported on 2022)      scopolamine (TRANSDERM-SCOP) 1 mg/3 days TD 72 hr patch Place 1 patch on the skin every third day (Patient not taking: Reported on 2024)       No current facility-administered medications on file prior to visit.      Social History     Tobacco Use    Smoking status: Former     Types: Cigars     Quit date:      Years since quittin.4    Smokeless tobacco: Never    Tobacco comments:     rarely, a cigar   Vaping Use    Vaping status: Never Used   Substance and Sexual Activity    Alcohol use: Yes     Comment: 1 drink of vodka daily     Drug use: No    Sexual activity: Yes     Partners: Female     Objective     BP (!) 186/102 (BP Location: Right arm, Patient Position: Sitting, Cuff Size: Large)   Pulse 68   Ht 5' 10.5\" (1.791 m)   Wt 99.8 kg (220 lb)   SpO2 95%   BMI 31.12 kg/m²     Physical Exam  Vitals and nursing note reviewed.   Constitutional:       General: He is not in acute distress.     Appearance: Normal appearance. He is well-developed.   HENT:      Head: Normocephalic and atraumatic.   Eyes:      Conjunctiva/sclera: Conjunctivae normal.   Neck:      Vascular: No carotid bruit.   Cardiovascular:      Rate and Rhythm: Normal rate and regular rhythm.      Pulses:           Radial pulses are 2+ on the right side and 2+ on the left side.      Heart sounds: Murmur heard.   Pulmonary:      Effort: Pulmonary effort is normal. No respiratory distress.      Breath sounds: Normal breath sounds.   Abdominal:      Palpations: Abdomen is soft.      Tenderness: There is no abdominal tenderness.   Musculoskeletal:      Cervical back: Normal range of motion and neck supple. No tenderness.      Right lower leg: No edema.      Left lower leg: No edema.   Skin:     General: Skin is warm and dry.      Capillary Refill: Capillary refill takes less than " 2 seconds.      Findings: No lesion, rash or wound.   Neurological:      General: No focal deficit present.      Mental Status: He is alert and oriented to person, place, and time.      Cranial Nerves: No cranial nerve deficit, dysarthria or facial asymmetry.      Sensory: Sensation is intact.      Motor: Motor function is intact. No weakness.   Psychiatric:         Mood and Affect: Mood normal.         Behavior: Behavior normal.       Administrative Statements   I have spent a total time of 20 minutes on 06/06/24 In caring for this patient including Diagnostic results, Prognosis, Risks and benefits of tx options, Instructions for management, Patient and family education, Importance of tx compliance, Risk factor reductions, Impressions, Counseling / Coordination of care, Documenting in the medical record, Reviewing / ordering tests, medicine, procedures  , and Obtaining or reviewing history  .

## 2024-06-06 NOTE — ASSESSMENT & PLAN NOTE
Patient is denying any concerning symptoms at this time. Patient is denying any TIA/CVA symptoms (amaurosis fugax, slurred speech, facial droop, dysphagia, unilateral weakness, headache).  Patient reports that he exercises regularly and plays golf daily.  He remains active and independent with ADLs.    Imaging:  Carotid duplex 4/26/2024:  Widely patent right ICA and endarterectomy site, <50% stenosis left ICA    Plan:  -We discussed the pathophysiology of carotid artery disease as well as contributing lifestyle factors.  -We discussed the results of carotid duplex at length.  Widely patent right ICA and endarterectomy site, mild plaque to left ICA.  -Continue medical optimization with aspirin and atorvastatin.  -Will repeat carotid duplex in 1 year.  -Instructed patient to report to the ED for any TIA/CVA symptoms.  -Follow up in the office in 1 year

## 2024-06-06 NOTE — ASSESSMENT & PLAN NOTE
Patient's blood pressure is elevated in the office today, 186/102.  Blood pressure recheck 160/98.    -Continue medical management per PCP.  -Low-salt diet

## 2024-10-10 ENCOUNTER — TELEPHONE (OUTPATIENT)
Age: 84
End: 2024-10-10

## 2024-12-04 ENCOUNTER — OFFICE VISIT (OUTPATIENT)
Dept: UROLOGY | Facility: CLINIC | Age: 84
End: 2024-12-04
Payer: COMMERCIAL

## 2024-12-04 ENCOUNTER — TELEPHONE (OUTPATIENT)
Dept: UROLOGY | Facility: CLINIC | Age: 84
End: 2024-12-04

## 2024-12-04 VITALS
HEIGHT: 70 IN | HEART RATE: 77 BPM | OXYGEN SATURATION: 96 % | BODY MASS INDEX: 31.92 KG/M2 | DIASTOLIC BLOOD PRESSURE: 80 MMHG | SYSTOLIC BLOOD PRESSURE: 140 MMHG | WEIGHT: 223 LBS

## 2024-12-04 DIAGNOSIS — C61 PROSTATE CANCER (HCC): Primary | ICD-10-CM

## 2024-12-04 PROCEDURE — 99213 OFFICE O/P EST LOW 20 MIN: CPT

## 2024-12-04 NOTE — Clinical Note
Please inform patient that we call the lab that he told us he got results that and unfortunately do not have any record of the PSA.  Thus we will have to have him get an updated PSA.  We will call him with the results of this

## 2024-12-04 NOTE — TELEPHONE ENCOUNTER
----- Message from Joseph Mcmillan PA-C sent at 12/4/2024  4:01 PM EST -----  Please inform patient that we call the lab that he told us he got results that and unfortunately do not have any record of the PSA.  Thus we will have to have him get an updated PSA.  We will call him with the results of this

## 2024-12-04 NOTE — PROGRESS NOTES
Office Visit- Urology  Edgar Agarwal 1940 MRN: [id number]      Assessment/Discussion/Plan    84 y.o. male managed by     Sorento 6 prostate cancer  -Status post brachytherapy and 6-month Lupron in 2006  -PSAs have been range between 0.2-0.3 over the past 2 decades  -Most recent PSA that I can see was 0.54 in October 2023 which is still relatively stable  -Patient states he had a PSA 2 months ago at [facility].  I do not see in the system.  Will call to get the results.  I will call him with the results of the PSA  -Offered prostate examination the patient defers  -If PSA is low and stable can consider repeat PSA for follow-up in 1 year    Update after visit was completed: We called the lab that patient states he got his PSA lab work done at and do not have any record of it.  Thus we will request that patient get updated PSA and we will call with results of that.    Chief Complaint:   Edgar is a 84 y.o. male presenting to the office for a follow up visit regarding Eligio 6 prostate cancer        Subjective    Patient is a 84-year-old male with past urologic history of Sorento 6 prostate cancer status post brachytherapy and 6-month Lupron who presents to the office today for follow-up.  Last seen in the office in 2022.  PSA baseline has been between 0.2-0.3 for many years.  Most recent PSA I can see in chart is a PSA of 0.54 in October 2023.  Patient states he obtained an updated PSA 2 months ago at [facility] but I do not see this in the chart.  He denies any bothersome urinary symptoms no dysuria, gross hematuria, flank pain      AUA SYMPTOM SCORE      Flowsheet Row Most Recent Value   AUA SYMPTOM SCORE    How often have you had a sensation of not emptying your bladder completely after you finished urinating? 0 (P)     How often have you had to urinate again less than two hours after you finished urinating? 0 (P)     How often have you found you stopped and started again several times when you urinate? 0 (P)     How often  have you found it difficult to postpone urination? 1 (P)     How often have you had a weak urinary stream? 0 (P)     How often have you had to push or strain to begin urination? 0 (P)     How many times did you most typically get up to urinate from the time you went to bed at night until the time you got up in the morning? 1 (P)     Quality of Life: If you were to spend the rest of your life with your urinary condition just the way it is now, how would you feel about that? 0 (P)     AUA SYMPTOM SCORE 2 (P)              ROS:   Review of Systems   Constitutional: Negative.  Negative for chills, fatigue and fever.   HENT: Negative.     Respiratory:  Negative for shortness of breath.    Cardiovascular:  Negative for chest pain.   Gastrointestinal: Negative.  Negative for abdominal pain.   Endocrine: Negative.    Musculoskeletal: Negative.    Skin: Negative.    Neurological: Negative.  Negative for dizziness and light-headedness.   Hematological: Negative.    Psychiatric/Behavioral: Negative.           Past Medical History  Past Medical History:   Diagnosis Date    COVID-19 vaccine administered     2 doses Moderna    GI bleed     Heart murmur     History of transfusion     with bleeding ulcer, no reaction    Hyperlipidemia     Hypertension     Prostate cancer (HCC)        Past Surgical History  Past Surgical History:   Procedure Laterality Date    AORTIC VALVE SURGERY      BACK SURGERY  2014    CAROTID ARTERY ANGIOPLASTY      COLONOSCOPY      ESOPHAGOGASTRODUODENOSCOPY N/A 2/20/2018    Procedure: ESOPHAGOGASTRODUODENOSCOPY (EGD) with gold probe, hemoclip, 7ml epi injection. and bx;  Surgeon: Royal Levine MD;  Location: [facility];  Service: Gastroenterology    SKIN LESION EXCISION Right 10/14/2021    Procedure: EXCISION CHEEK LESION WITH FROZEN SECTION;  Surgeon: Lennie Funk DO;  Location:  MAIN OR;  Service: Plastics    UPPER GASTROINTESTINAL ENDOSCOPY         Past Family History  Family History   Problem Relation  Age of Onset    Hypertension Mother     Cancer Father         lung       Past Social history  Social History     Socioeconomic History    Marital status: /Civil Union     Spouse name: Not on file    Number of children: Not on file    Years of education: Not on file    Highest education level: Not on file   Occupational History    Not on file   Tobacco Use    Smoking status: Former     Types: Cigars     Quit date:      Years since quittin.9    Smokeless tobacco: Never    Tobacco comments:     rarely, a cigar   Vaping Use    Vaping status: Never Used   Substance and Sexual Activity    Alcohol use: Yes     Comment: 1 drink of vodka daily     Drug use: No    Sexual activity: Yes     Partners: Female   Other Topics Concern    Not on file   Social History Narrative    Not on file     Social Drivers of Health     Financial Resource Strain: Low Risk  (2024)    Received from [facility]    Overall Financial Resource Strain (CARDIA)     Difficulty of Paying Living Expenses: Not hard at all   Food Insecurity: No Food Insecurity (2024)    Received from [facility]    Hunger Vital Sign     Worried About Running Out of Food in the Last Year: Never true     Ran Out of Food in the Last Year: Never true   Transportation Needs: No Transportation Needs (2024)    Received from [facility]    PRAPARE - Transportation     Lack of Transportation (Medical): No     Lack of Transportation (Non-Medical): No   Physical Activity: Not on file   Stress: No Stress Concern Present (2024)    Received from [facility]    [employer] - Occupational Stress Questionnaire     Feeling of Stress : Not at all   Social Connections: Feeling Socially Integrated (2024)    Received from [facility]    OASIS : Social Isolation     How often do you feel lonely or isolated from those around you?: Never  "  Intimate Partner Violence: Not At Risk (4/25/2024)    Received from [facility]    Humiliation, Afraid, Rape, and Kick questionnaire     Fear of Current or Ex-Partner: No     Emotionally Abused: No     Physically Abused: No     Sexually Abused: No   Housing Stability: Low Risk  (4/25/2024)    Received from [facility]    Housing Stability Vital Sign     Unable to Pay for Housing in the Last Year: No     Number of Times Moved in the Last Year: 0     Homeless in the Last Year: No       Current Medications  Current Outpatient Medications   Medication Sig Dispense Refill    amLODIPine (NORVASC) 10 mg tablet amlodipine 10 mg tablet      aspirin (ASPIRIN 81) 81 mg chewable tablet Chew 1 tablet (81 mg total) daily  0    atorvastatin (LIPITOR) 40 mg tablet TAKE 1 TABLET BY MOUTH EVERY DAY AT NIGHT      brimonidine (ALPHAGAN P) 0.15 % ophthalmic solution       carvedilol (COREG) 25 mg tablet Take 25 mg by mouth 2 (two) times a day      pantoprazole (PROTONIX) 40 mg tablet TAKE 1 TABLET BY MOUTH EVERY DAY 90 tablet 0    tamsulosin (FLOMAX) 0.4 mg TAKE ONE CAPSULE BY MOUTH EVERY DAY WITH DINNER 90 capsule 0    amoxicillin (AMOXIL) 500 mg capsule  (Patient not taking: Reported on 6/6/2024)      latanoprost (XALATAN) 0.005 % ophthalmic solution Administer to both eyes daily (Patient not taking: Reported on 1/10/2024)  5    losartan (COZAAR) 100 MG tablet Take 100 mg by mouth daily (Patient not taking: Reported on 12/1/2022)      scopolamine (TRANSDERM-SCOP) 1 mg/3 days TD 72 hr patch Place 1 patch on the skin every third day (Patient not taking: Reported on 6/6/2024)       No current facility-administered medications for this visit.       Allergies  No Known Allergies    OBJECTIVE    Vitals   Vitals:    12/04/24 1525   BP: 140/80   BP Location: Left arm   Patient Position: Sitting   Cuff Size: Adult   Pulse: 77   SpO2: 96%   Weight: 101 kg (223 lb)   Height: 5' 10\" (1.778 m) "       PVR:    Physical Exam  Constitutional:       General: He is not in acute distress.     Appearance: Normal appearance. He is normal weight. He is not ill-appearing or toxic-appearing.   HENT:      Head: Normocephalic and atraumatic.   Eyes:      Conjunctiva/sclera: Conjunctivae normal.   Cardiovascular:      Rate and Rhythm: Normal rate.   Pulmonary:      Effort: Pulmonary effort is normal. No respiratory distress.   Skin:     General: Skin is warm and dry.   Neurological:      General: No focal deficit present.      Mental Status: He is alert and oriented to person, place, and time.      Cranial Nerves: No cranial nerve deficit.   Psychiatric:         Mood and Affect: Mood normal.         Behavior: Behavior normal.         Thought Content: Thought content normal.          Labs:     Lab Results   Component Value Date    PSA 0.2 09/27/2021     Lab Results   Component Value Date    CREATININE 0.77 11/21/2024      Lab Results   Component Value Date    HGBA1C 5.7 (H) 11/21/2024     Lab Results   Component Value Date    CALCIUM 9.2 11/21/2024    K 3.9 11/21/2024    CO2 27 11/21/2024     11/21/2024    BUN 20 11/21/2024    CREATININE 0.77 11/21/2024       I have personally reviewed all pertinent lab results and reviewed with patient    Imaging       Joseph Mcmillan PA-C  Date: 12/4/2024 Time: 3:52 PM  [facility]    This note was written using fluency dictation software. Please excuse any resulting minor grammatical errors.

## 2024-12-04 NOTE — TELEPHONE ENCOUNTER
Called and spoke with the patient and made him aware [facility] does not have a record of his PSA.  Patient states he is going to go to [facility] tomorrow morning and then will call the office with an update.

## 2024-12-05 NOTE — TELEPHONE ENCOUNTER
PT called and stated that he was able to find out where he got the PSA done.  He contacted them and they are going to call our office to fax over the results.

## 2024-12-05 NOTE — TELEPHONE ENCOUNTER
Deidre, from [employer] called for office fax#.  Deidre sts that she will fax the pt's PSA results to [facility].  Provided Deidre with office fax number.

## 2024-12-11 NOTE — TELEPHONE ENCOUNTER
Called the patient to inform him of PSA result 0.65. Patient to obtain PSA in 6 months and then office visit in one year.

## 2024-12-11 NOTE — TELEPHONE ENCOUNTER
PSA and patient history reviewed. Plan for PSA in 6 months and then in 1 year. OV already scheduled. Please let patient know.

## 2024-12-11 NOTE — TELEPHONE ENCOUNTER
Patient calling as he has not heard back about PSA results and was told they were being faxed. I explained we called [facility] and they told us they do not have a PSA on file. I called [facility] and spoke with Verónica MART who said the same thing, I explained patient went to the lab and was told there is a PSA on file, Verónica stated yes from October, I explained yes that is what we need.     She is faxing report to [facility] office:[phone] ,she gave a verbal as well.  PSA 10/10/24: 0.65    Called patient and let him know we got a verbal and will send to provider for review and await fax.

## 2024-12-12 ENCOUNTER — RESULTS FOLLOW-UP (OUTPATIENT)
Dept: UROLOGY | Facility: CLINIC | Age: 84
End: 2024-12-12

## 2025-04-25 ENCOUNTER — HOSPITAL ENCOUNTER (OUTPATIENT)
Dept: NON INVASIVE DIAGNOSTICS | Facility: HOSPITAL | Age: 85
Discharge: HOME | End: 2025-04-25
Payer: COMMERCIAL

## 2025-04-25 ENCOUNTER — RESULTS FOLLOW-UP (OUTPATIENT)
Dept: OTHER | Facility: HOSPITAL | Age: 85
End: 2025-04-25

## 2025-04-25 DIAGNOSIS — I65.23 BILATERAL CAROTID ARTERY STENOSIS: ICD-10-CM

## 2025-04-25 PROCEDURE — 93880 EXTRACRANIAL BILAT STUDY: CPT | Performed by: STUDENT IN AN ORGANIZED HEALTH CARE EDUCATION/TRAINING PROGRAM

## 2025-04-25 PROCEDURE — 93880 EXTRACRANIAL BILAT STUDY: CPT

## 2025-07-02 NOTE — PROGRESS NOTES
Name: Edgar Agarwal      : 1940      MRN: [id number]  Encounter Provider: PASTORA Jones  Encounter Date: 7/3/2025   Encounter department: [facility] [place]  :  Assessment & Plan  Bilateral carotid artery stenosis  Patient is denying any concerning symptoms at this time. Patient is denying any TIA/CVA symptoms (amaurosis fugax, slurred speech, facial droop, dysphagia, unilateral weakness, headache). He remains active and independent with ADLs.    Imaging:  Carotid duplex 2025:  Widely patent right ICA and endarterectomy site, <50% stenosis left ICA    Plan:  -We discussed the pathophysiology of carotid artery disease as well as contributing lifestyle factors.  -We discussed the results of carotid duplex at length.  Widely patent right ICA and endarterectomy site, mild plaque to left ICA.  -Continue medical optimization with aspirin and atorvastatin.  -Will repeat carotid duplex in 1 year.  -Instructed patient to report to the ED for any TIA/CVA symptoms.  -Follow up in the office in 1 year    Orders:    VAS carotid complete study; Future    Essential hypertension  Blood pressure is well controlled in the office today, 136/84.    -Continue medical management per PCP  -Low salt diet         Mixed hyperlipidemia  Continue atorvastatin.             History of Present Illness   HPI  Edgar Agarwal is a 84 y.o. male, former smoker, with PMH HTN, HLD, prostate cancer, aortic valve stenosis, s/p aortic valve replacement, and asymptomatic bilateral carotid artery stenosis s/p right CEA.  Patient is presenting to the vascular surgery office to review results of carotid duplex completed 2025.     Patient is denying any concerning symptoms at this time. Patient is denying any TIA/CVA symptoms (amaurosis fugax, slurred speech, facial droop, dysphagia, unilateral weakness, headache). He remains active and independent with ADLs.    History obtained from: patient    Review of Systems    HENT:  Negative for trouble swallowing.    Eyes:  Negative for visual disturbance.   Neurological:  Negative for facial asymmetry, speech difficulty, weakness, light-headedness and numbness.     Pertinent Medical History   Past Medical History[1]      Medical History Reviewed by provider this encounter:     .  Medications Ordered Prior to Encounter[2]   Social History[3]     Objective   There were no vitals taken for this visit.     Physical Exam  Vitals and nursing note reviewed.   Constitutional:       General: He is not in acute distress.     Appearance: Normal appearance. He is well-developed.   HENT:      Head: Normocephalic and atraumatic.     Eyes:      Conjunctiva/sclera: Conjunctivae normal.     Neck:      Vascular: No carotid bruit.     Cardiovascular:      Rate and Rhythm: Normal rate and regular rhythm.      Pulses:           Radial pulses are 2+ on the right side and 2+ on the left side.   Pulmonary:      Effort: Pulmonary effort is normal. No respiratory distress.   Abdominal:      Palpations: Abdomen is soft.      Tenderness: There is no abdominal tenderness.     Musculoskeletal:         General: No swelling or tenderness.      Cervical back: Normal range of motion and neck supple. No tenderness.      Right lower leg: Edema present.      Left lower leg: Edema present.     Skin:     General: Skin is warm and dry.      Capillary Refill: Capillary refill takes less than 2 seconds.     Neurological:      General: No focal deficit present.      Mental Status: He is alert and oriented to person, place, and time.      Cranial Nerves: No cranial nerve deficit, dysarthria or facial asymmetry.      Sensory: Sensation is intact.      Motor: Motor function is intact. No weakness.     Psychiatric:         Mood and Affect: Mood normal.         Behavior: Behavior normal.         Administrative Statements   I have spent a total time of 20 minutes in caring for this patient on the day of the visit/encounter  including Diagnostic results, Prognosis, Risks and benefits of tx options, Instructions for management, Patient and family education, Importance of tx compliance, Risk factor reductions, Impressions, Counseling / Coordination of care, Documenting in the medical record, Reviewing/placing orders in the medical record (including tests, medications, and/or procedures), and Obtaining or reviewing history  .       [1]   Past Medical History:  Diagnosis Date    COVID-19 vaccine administered     2 doses Moderna    GI bleed     Heart murmur     History of transfusion     with bleeding ulcer, no reaction    Hyperlipidemia     Hypertension     Prostate cancer (HCC)    [2]   Current Outpatient Medications on File Prior to Visit   Medication Sig Dispense Refill    amLODIPine (NORVASC) 10 mg tablet       aspirin (ASPIRIN 81) 81 mg chewable tablet Chew 1 tablet (81 mg total) daily  0    atorvastatin (LIPITOR) 40 mg tablet       brimonidine (ALPHAGAN P) 0.15 % ophthalmic solution       carvedilol (COREG) 25 mg tablet Take 25 mg by mouth in the morning and 25 mg in the evening.      pantoprazole (PROTONIX) 40 mg tablet TAKE 1 TABLET BY MOUTH EVERY DAY 90 tablet 0    tamsulosin (FLOMAX) 0.4 mg TAKE ONE CAPSULE BY MOUTH EVERY DAY WITH DINNER 90 capsule 0    amoxicillin (AMOXIL) 500 mg capsule  (Patient not taking: Reported on 2024)      latanoprost (XALATAN) 0.005 % ophthalmic solution Administer to both eyes daily (Patient not taking: Reported on 1/10/2024)  5    losartan (COZAAR) 100 MG tablet Take 100 mg by mouth daily (Patient not taking: Reported on 2022)      scopolamine (TRANSDERM-SCOP) 1 mg/3 days TD 72 hr patch Place 1 patch on the skin every third day (Patient not taking: Reported on 2024)       No current facility-administered medications on file prior to visit.   [3]   Social History  Tobacco Use    Smoking status: Former     Types: Cigars     Quit date: 2013     Years since quittin.5    Smokeless  tobacco: Never    Tobacco comments:     rarely, a cigar   Vaping Use    Vaping status: Never Used   Substance and Sexual Activity    Alcohol use: Yes     Comment: 1 drink of vodka daily     Drug use: No    Sexual activity: Yes     Partners: Female

## 2025-07-03 ENCOUNTER — OFFICE VISIT (OUTPATIENT)
Dept: VASCULAR SURGERY | Facility: CLINIC | Age: 85
End: 2025-07-03
Payer: COMMERCIAL

## 2025-07-03 ENCOUNTER — TELEPHONE (OUTPATIENT)
Dept: VASCULAR SURGERY | Facility: CLINIC | Age: 85
End: 2025-07-03

## 2025-07-03 VITALS
DIASTOLIC BLOOD PRESSURE: 84 MMHG | SYSTOLIC BLOOD PRESSURE: 136 MMHG | OXYGEN SATURATION: 95 % | HEART RATE: 65 BPM | HEIGHT: 70 IN | WEIGHT: 222 LBS | BODY MASS INDEX: 31.78 KG/M2

## 2025-07-03 DIAGNOSIS — E78.2 MIXED HYPERLIPIDEMIA: ICD-10-CM

## 2025-07-03 DIAGNOSIS — I65.23 BILATERAL CAROTID ARTERY STENOSIS: Primary | ICD-10-CM

## 2025-07-03 DIAGNOSIS — I10 ESSENTIAL HYPERTENSION: ICD-10-CM

## 2025-07-03 PROCEDURE — 99213 OFFICE O/P EST LOW 20 MIN: CPT

## 2025-07-03 NOTE — ASSESSMENT & PLAN NOTE
Patient is denying any concerning symptoms at this time. Patient is denying any TIA/CVA symptoms (amaurosis fugax, slurred speech, facial droop, dysphagia, unilateral weakness, headache). He remains active and independent with ADLs.    Imaging:  Carotid duplex 4/25/2025:  Widely patent right ICA and endarterectomy site, <50% stenosis left ICA    Plan:  -We discussed the pathophysiology of carotid artery disease as well as contributing lifestyle factors.  -We discussed the results of carotid duplex at length.  Widely patent right ICA and endarterectomy site, mild plaque to left ICA.  -Continue medical optimization with aspirin and atorvastatin.  -Will repeat carotid duplex in 1 year.  -Instructed patient to report to the ED for any TIA/CVA symptoms.  -Follow up in the office in 1 year    Orders:    VAS carotid complete study; Future

## 2025-07-03 NOTE — TELEPHONE ENCOUNTER
This is a reminder; patient is due for RR w AP . Please call patient and schedule the following by the dates provided.    Patient's appointment(s) are due on or after 5/15/26.    Dopplers  [] Abdominal Aorta Iliac (AOIL)  [x] Carotid (CV) (set 4/28/26)  [] Celiac and/or Mesenteric  [] Endovascular Aortic Repair (EVAR)   [] Hemodialysis Access (HD)   [] Lower Limb Arterial (MARCY)  [] Lower Limb Venous (LEV)  [] Lower Limb Venous Duplex with Reflux (LEVDR)  [] Renal Artery  [] Upper Limb Arterial (UEA)    [] Upper Limb Venous (UEV)              [] KAYLEIGH and Waveform analysis     Advanced Imaging   [] CTA head/neck    [] CTA abdomen    [] CTA abdomen & pelvis    [] CT abdomen with/ without contrast  [] CT abdomen with contrast  [] CT abdomen without contrast    [] CT abdomen & pelvis with/ without contrast  [] CT abdomen & pelvis with contrast  [] CT abdomen & pelvis without contrast    Office Visit   [] New patient, patient last seen over 3 years ago  [] Risk factor modification (RFM)   [x] Follow up   [] Lost to follow up (LTFU)

## 2025-07-03 NOTE — ASSESSMENT & PLAN NOTE
Blood pressure is well controlled in the office today, 136/84.    -Continue medical management per PCP  -Low salt diet

## (undated) DEVICE — SKIN MARKER DUAL TIP WITH RULER CAP, FLEXIBLE RULER AND LABELS: Brand: DEVON

## (undated) DEVICE — PENCIL ELECTROSURG E-Z CLEAN -0035H

## (undated) DEVICE — ELECTRODE NEEDLE MOD E-Z CLEAN 2.75IN 7CM -0013M

## (undated) DEVICE — WORKING LENGTH 235CM, WORKING CHANNEL 2.8MM: Brand: RESOLUTION 360 CLIP

## (undated) DEVICE — BASIC PACK: Brand: CONVERTORS

## (undated) DEVICE — SYRINGE 10ML LL

## (undated) DEVICE — SUT MONOCRYL 4-0 P-3 18 IN Y494G

## (undated) DEVICE — FLEXIBLE ADHESIVE BANDAGE,X-LARGE: Brand: CURITY

## (undated) DEVICE — STERILE POLYISOPRENE POWDER-FREE SURGICAL GLOVES: Brand: PROTEXIS

## (undated) DEVICE — NEEDLE 25G X 1 1/2

## (undated) DEVICE — SINGLE-USE BIOPSY FORCEPS: Brand: RADIAL JAW 4

## (undated) DEVICE — 1820 FOAM BLOCK NEEDLE COUNTER: Brand: DEVON

## (undated) DEVICE — SUT SILK 3-0 FS-1 684G

## (undated) DEVICE — SPONGE 4 X 4 XRAY 16 PLY STRL LF RFD

## (undated) DEVICE — SCD SEQUENTIAL COMPRESSION COMFORT SLEEVE MEDIUM KNEE LENGTH: Brand: KENDALL SCD

## (undated) DEVICE — BIPOLAR ELECTROHEMOSTASIS CATHETER: Brand: INJECTION GOLD PROBE

## (undated) DEVICE — SYRINGE 30ML LL

## (undated) DEVICE — STERILE POLYISOPRENE POWDER-FREE SURGICAL GLOVES WITH EMOLLIENT COATING: Brand: PROTEXIS

## (undated) DEVICE — INTENDED FOR TISSUE SEPARATION, AND OTHER PROCEDURES THAT REQUIRE A SHARP SURGICAL BLADE TO PUNCTURE OR CUT.: Brand: BARD-PARKER SAFETY BLADES SIZE 15, STERILE

## (undated) DEVICE — SUT PROLENE 6-0 P-3 18 IN 8695G

## (undated) DEVICE — LIGHT GLOVE GREEN

## (undated) DEVICE — NEEDLE BLUNT 18 G X 1 1/2IN

## (undated) DEVICE — NEEDLE SCLERO INTERJECT 25G 240MM

## (undated) DEVICE — SPONGE STICK WITH PVP-I: Brand: KENDALL